# Patient Record
Sex: MALE | Race: WHITE
[De-identification: names, ages, dates, MRNs, and addresses within clinical notes are randomized per-mention and may not be internally consistent; named-entity substitution may affect disease eponyms.]

---

## 2017-09-09 ENCOUNTER — HOSPITAL ENCOUNTER (EMERGENCY)
Dept: HOSPITAL 31 - C.ER | Age: 42
Discharge: HOME | End: 2017-09-09
Payer: COMMERCIAL

## 2017-09-09 VITALS — HEART RATE: 68 BPM | RESPIRATION RATE: 18 BRPM | DIASTOLIC BLOOD PRESSURE: 72 MMHG | SYSTOLIC BLOOD PRESSURE: 110 MMHG

## 2017-09-09 VITALS — OXYGEN SATURATION: 99 % | TEMPERATURE: 97.9 F

## 2017-09-09 DIAGNOSIS — H60.91: Primary | ICD-10-CM

## 2017-09-09 PROCEDURE — 96372 THER/PROPH/DIAG INJ SC/IM: CPT

## 2017-09-09 PROCEDURE — 99283 EMERGENCY DEPT VISIT LOW MDM: CPT

## 2017-09-09 NOTE — C.PDOC
History Of Present Illness


42 yr old male presents to the ER with complaints of right ear pain for the 

past few days. Patient [place] [employer] [place] and states the pain 

started afterwards. Patient denies fever, chills, hearing loss, ear discharge, 

headache, weakness or numbness.


Time Seen by Provider: 09/09/17 08:29


Chief Complaint (Nursing): ENT Problem


History Per: Patient


History/Exam Limitations: no limitations


Onset/Duration Of Symptoms: Days





Past Medical History


Reviewed: Historical Data, Nursing Documentation, Vital Signs


Vital Signs: 


 Last Vital Signs











Temp  97.9 F   09/09/17 08:23


 


Pulse  69   09/09/17 08:23


 


Resp  16   09/09/17 08:23


 


BP  113/75   09/09/17 08:23


 


Pulse Ox  99   09/09/17 08:43














- Medical History


PMH: Asthma, Hypercholesterolemia


Surgical History: Appendectomy


Family History: States: No Known Family Hx





- Social History


Hx Tobacco Use: No


Hx Alcohol Use: No


Hx Substance Use: No





- Immunization History


Hx Tetanus Toxoid Vaccination: No


Hx Influenza Vaccination: No


Hx Pneumococcal Vaccination: No





Review Of Systems


Except As Marked, All Systems Reviewed And Found Negative.


Constitutional: Negative for: Fever, Chills


ENT: Positive for: Ear Pain (Right).  Negative for: Ear Discharge


Neurological: Negative for: Weakness, Numbness, Headache





Physical Exam





- Physical Exam


Appears: Non-toxic, No Acute Distress


Skin: Warm, Dry, No Rash


Head: Atraumatic, Normacephalic


Ear(s): Left: Normal, Right: Other ((+) Ear canal, mild edema. (-) TM intact 

without erythema. No redness or tenderness over the mastoid. No preauricular 

lymph node. )


Throat: Normal, No Erythema, No Exudate, No Drooling


Neck: Normal, Normal ROM, Supple


Extremity: Normal ROM, No Swelling


Neurological/Psych: Oriented x3, Normal Speech





ED Course And Treatment


O2 Sat by Pulse Oximetry: 99 (RA)


Pulse Ox Interpretation: Normal





Disposition





- Disposition


Referrals: 


Behin,Babak, MD [Staff Provider] - 


Whit Salinas MD [Non-Staff] - 


Disposition: HOME/ ROUTINE


Disposition Time: 08:43


Condition: GOOD


Additional Instructions: 


Please follow up with your doctor. The contact for the ENT specialist is also 

provided. Return to the ER for any worsening symptoms, fever, redness or pain 

or swelling behind the ear or in the face, if you are not any better in 2 days, 

or for any other concerns.  


Prescriptions: 


Ciprofloxacin HCl [Cipro] 500 mg PO BID #14 tablet


Ciprofloxacin/Dexamethasone [Ciprodex 0.3%-0.1% 7.5 Ml] 4 drop OT QID #1 bottle


Instructions:  Otitis Externa (ED)


Forms:  [facility] (English)





- Clinical Impression


Clinical Impression: 


 Otitis externa








- Scribe Statement


The provider has reviewed the documentation as recorded by the Luda Rapp


Provider Attestation: 


All medical record entries made by the Luda were at my direction and 

personally dictated by me. I have reviewed the chart and agree that the record 

accurately reflects my personal performance of the history, physical exam, 

medical decision making, and the department course for this patient. I have 

also personally directed, reviewed, and agree with the discharge instructions 

and disposition.

## 2018-04-17 ENCOUNTER — HOSPITAL ENCOUNTER (INPATIENT)
Dept: HOSPITAL 31 - C.ER | Age: 43
LOS: 2 days | Discharge: HOME | DRG: 329 | End: 2018-04-19
Payer: COMMERCIAL

## 2018-04-17 DIAGNOSIS — F17.210: ICD-10-CM

## 2018-04-17 DIAGNOSIS — K60.3: ICD-10-CM

## 2018-04-17 DIAGNOSIS — K65.1: ICD-10-CM

## 2018-04-17 DIAGNOSIS — J45.909: ICD-10-CM

## 2018-04-17 DIAGNOSIS — K61.2: Primary | ICD-10-CM

## 2018-04-17 LAB
ALBUMIN SERPL-MCNC: 4.1 G/DL (ref 3.5–5)
ALBUMIN/GLOB SERPL: 0.9 {RATIO} (ref 1–2.1)
ALT SERPL-CCNC: 39 U/L (ref 21–72)
APTT BLD: 30 SECONDS (ref 21–34)
AST SERPL-CCNC: 31 U/L (ref 17–59)
BASOPHILS # BLD AUTO: 0.1 K/UL (ref 0–0.2)
BASOPHILS NFR BLD: 0.6 % (ref 0–2)
BILIRUB UR-MCNC: NEGATIVE MG/DL
BUN SERPL-MCNC: 12 MG/DL (ref 9–20)
CALCIUM SERPL-MCNC: 9 MG/DL (ref 8.6–10.4)
EOSINOPHIL # BLD AUTO: 0.2 K/UL (ref 0–0.7)
EOSINOPHIL NFR BLD: 1.6 % (ref 0–4)
ERYTHROCYTE [DISTWIDTH] IN BLOOD BY AUTOMATED COUNT: 13 % (ref 11.5–14.5)
GFR NON-AFRICAN AMERICAN: > 60
GLUCOSE UR STRIP-MCNC: NORMAL MG/DL
HGB BLD-MCNC: 15.3 G/DL (ref 12–18)
INR PPP: 1.2
LEUKOCYTE ESTERASE UR-ACNC: (no result) LEU/UL
LYMPHOCYTES # BLD AUTO: 3.1 K/UL (ref 1–4.3)
LYMPHOCYTES NFR BLD AUTO: 21.6 % (ref 20–40)
MCH RBC QN AUTO: 31.8 PG (ref 27–31)
MCHC RBC AUTO-ENTMCNC: 34.9 G/DL (ref 33–37)
MCV RBC AUTO: 91.1 FL (ref 80–94)
MONOCYTES # BLD: 1 K/UL (ref 0–0.8)
MONOCYTES NFR BLD: 6.8 % (ref 0–10)
NEUTROPHILS # BLD: 10.1 K/UL (ref 1.8–7)
NEUTROPHILS NFR BLD AUTO: 69.4 % (ref 50–75)
NRBC BLD AUTO-RTO: 0.2 % (ref 0–2)
PH UR STRIP: 5 [PH] (ref 5–8)
PLATELET # BLD: 244 K/UL (ref 130–400)
PMV BLD AUTO: 7.5 FL (ref 7.2–11.7)
PROT UR STRIP-MCNC: (no result) MG/DL
PROTHROMBIN TIME: 13.4 SECONDS (ref 9.7–12.2)
RBC # BLD AUTO: 4.81 MIL/UL (ref 4.4–5.9)
RBC # UR STRIP: NEGATIVE /UL
SP GR UR STRIP: 1.04 (ref 1–1.03)
SQUAMOUS EPITHIAL: 1 /HPF (ref 0–5)
UROBILINOGEN UR-MCNC: 4 MG/DL (ref 0.2–1)
WBC # BLD AUTO: 14.5 K/UL (ref 4.8–10.8)

## 2018-04-17 PROCEDURE — 0D9P8ZZ DRAINAGE OF RECTUM, VIA NATURAL OR ARTIFICIAL OPENING ENDOSCOPIC: ICD-10-PCS

## 2018-04-17 RX ADMIN — HYDROMORPHONE HYDROCHLORIDE PRN MG: 1 INJECTION, SOLUTION INTRAMUSCULAR; INTRAVENOUS; SUBCUTANEOUS at 18:40

## 2018-04-17 RX ADMIN — HYDROMORPHONE HYDROCHLORIDE PRN MG: 1 INJECTION, SOLUTION INTRAMUSCULAR; INTRAVENOUS; SUBCUTANEOUS at 18:30

## 2018-04-17 RX ADMIN — HYDROMORPHONE HYDROCHLORIDE PRN MG: 1 INJECTION, SOLUTION INTRAMUSCULAR; INTRAVENOUS; SUBCUTANEOUS at 18:50

## 2018-04-17 RX ADMIN — DEXTROSE AND SODIUM CHLORIDE SCH MLS/HR: 5; 450 INJECTION, SOLUTION INTRAVENOUS at 20:28

## 2018-04-17 RX ADMIN — HYDROMORPHONE HYDROCHLORIDE PRN MG: 1 INJECTION, SOLUTION INTRAMUSCULAR; INTRAVENOUS; SUBCUTANEOUS at 18:20

## 2018-04-17 NOTE — CP.PCM.HP
History of Present Illness





- History of Present Illness


History of Present Illness: 





History Of Present Illness


43 year old male presents to the ED c/o perirectal abscess for the past 4 days. 

Patient reports yesterday had a fever of 101. Patient denies weakness, numbness

, CP, SOB, nausea, vomit, diarrhea. 





Past Patient History





- Past Social History


Smoking Status: Light Smoker < 10 Cigarettes Daily





- CARDIAC


Hx Hypercholesterolemia: Yes





- PULMONARY


Hx Asthma: Yes





- MUSCULOSKELETAL/RHEUMATOLOGICAL


Hx Falls: No





- PSYCHIATRIC


Hx Substance Use: No





- SURGICAL HISTORY


Hx Appendectomy: Yes





- ANESTHESIA


Hx Anesthesia: Yes


Hx Anesthesia Reactions: No


Hx Malignant Hyperthermia: No


Has any member of the family had a problem w/ anesthesia?: No





Meds


Allergies/Adverse Reactions: 


 Allergies











Allergy/AdvReac Type Severity Reaction Status Date / Time


 


No Known Allergies Allergy   Verified 04/17/18 10:20














Results





- Vital Signs


Recent Vital Signs: 





 Last Vital Signs











Temp  98.7 F   04/17/18 20:30


 


Pulse  85   04/17/18 20:30


 


Resp  20   04/17/18 20:30


 


BP  108/74   04/17/18 20:30


 


Pulse Ox  95   04/17/18 20:30














- Labs


Result Diagrams: 


 04/17/18 11:31





 04/17/18 12:06


Labs: 





 Laboratory Results - last 24 hr











  04/17/18 04/17/18 04/17/18





  11:31 12:06 12:06


 


WBC  14.5 H  


 


RBC  4.81  


 


Hgb  15.3  


 


Hct  43.8  


 


MCV  91.1  D  


 


MCH  31.8 H  


 


MCHC  34.9  


 


RDW  13.0  


 


Plt Count  244  


 


MPV  7.5  


 


Neut % (Auto)  69.4  


 


Lymph % (Auto)  21.6  


 


Mono % (Auto)  6.8  


 


Eos % (Auto)  1.6  


 


Baso % (Auto)  0.6  


 


Neut # (Auto)  10.1 H  


 


Lymph # (Auto)  3.1  


 


Mono # (Auto)  1.0 H  


 


Eos # (Auto)  0.2  


 


Baso # (Auto)  0.1  


 


PT   13.4 H 


 


INR   1.2 


 


APTT   30 


 


Sodium    141


 


Potassium    4.3


 


Chloride    100


 


Carbon Dioxide    26


 


Anion Gap    19


 


BUN    12


 


Creatinine    1.0


 


Est GFR ( Amer)    > 60


 


Est GFR (Non-Af Amer)    > 60


 


Random Glucose    87


 


Calcium    9.0


 


Total Bilirubin    1.1


 


AST    31


 


ALT    39


 


Alkaline Phosphatase    63


 


Total Protein    8.7 H


 


Albumin    4.1


 


Globulin    4.5 H


 


Albumin/Globulin Ratio    0.9 L


 


Urine Color   


 


Urine Clarity   


 


Urine pH   


 


Ur Specific Gravity   


 


Urine Protein   


 


Urine Glucose (UA)   


 


Urine Ketones   


 


Urine Blood   


 


Urine Nitrate   


 


Urine Bilirubin   


 


Urine Urobilinogen   


 


Ur Leukocyte Esterase   


 


Urine WBC (Auto)   


 


Ur Squamous Epith Cells   














  04/17/18





  14:05


 


WBC 


 


RBC 


 


Hgb 


 


Hct 


 


MCV 


 


MCH 


 


MCHC 


 


RDW 


 


Plt Count 


 


MPV 


 


Neut % (Auto) 


 


Lymph % (Auto) 


 


Mono % (Auto) 


 


Eos % (Auto) 


 


Baso % (Auto) 


 


Neut # (Auto) 


 


Lymph # (Auto) 


 


Mono # (Auto) 


 


Eos # (Auto) 


 


Baso # (Auto) 


 


PT 


 


INR 


 


APTT 


 


Sodium 


 


Potassium 


 


Chloride 


 


Carbon Dioxide 


 


Anion Gap 


 


BUN 


 


Creatinine 


 


Est GFR ( Amer) 


 


Est GFR (Non-Af Amer) 


 


Random Glucose 


 


Calcium 


 


Total Bilirubin 


 


AST 


 


ALT 


 


Alkaline Phosphatase 


 


Total Protein 


 


Albumin 


 


Globulin 


 


Albumin/Globulin Ratio 


 


Urine Color  Yellow


 


Urine Clarity  Clear


 


Urine pH  5.0


 


Ur Specific Gravity  1.040 H


 


Urine Protein  1+ H


 


Urine Glucose (UA)  Normal


 


Urine Ketones  Negative


 


Urine Blood  Negative


 


Urine Nitrate  Negative


 


Urine Bilirubin  Negative


 


Urine Urobilinogen  4.0


 


Ur Leukocyte Esterase  Neg


 


Urine WBC (Auto)  1


 


Ur Squamous Epith Cells  1














Assessment & Plan


(1) Perianal abscess


Status: Acute

## 2018-04-17 NOTE — CT
PROCEDURE:  CT Pelvis with contrast 



HISTORY:

perirectal abscess



COMPARISON:

None.



TECHNIQUE:

Contiguous axial images of the pelvis with intravenous contrast only. 

Coronal and sagittal reformats generated.



Contrast dose: Visipaque 320, 100 cc



Radiation dose:



Total exam DLP = 865.47 mGy-cm.



This CT exam was performed using one or more of the following dose 

reduction techniques: Automated exposure control, adjustment of the 

mA and/or kV according to patient size, and/or use of iterative 

reconstruction technique.



FINDINGS:

In the right perianal space, there is local soft tissue edema 

posterior to the level of the anus extending to the midline and 

slightly to the left. Local lucency is appreciate within this density 

measuring 3.5 x 1.6 cm suggestive of a small abscess. 



BLADDER:

Unremarkable. No mass. 



REPRODUCTIVE ORGANS:

Unremarkable. 



VISUALIZED BOWEL:

Sigmoid diverticulosis is identified without diverticulitis as 

imaged. A linear hyperdensity at the cecal base may reflect surgical 

clips status post prior appendectomy.  The appendix is not 

identified.  Clinically correlate appear



PERITONEUM:

A limited matter perineal fat is identified within a small right 

inguinal hernia without bowel involvement. No free fluid. No free 

air. 



LYMPH NODES:

Unremarkable. No enlarged lymph nodes. 



VASCULATURE:

Unremarkable.



BONES:

No fracture or focal lesion.  



OTHER FINDINGS:

None.



IMPRESSION:

A small right perianal abscess is appreciated inferiorly slightly to 

the left measuring 3.5 x 1.6 cm.



Nonacute sigmoid diverticulosis. 



Findings suggest prior right appendectomy.  Clinically correlate. 

Small right inguinal hernia contains only limited amount of 

peritoneal fat.

## 2018-04-17 NOTE — C.PDOC
History Of Present Illness


43 year old male presents to the ED c/o perirectal abscess for the past 4 days. 

Patient reports yesterday had a fever of 101. Patient denies weakness, numbness

, CP, SOB, nausea, vomit, diarrhea. 


Time Seen by Provider: 18 11:26


Chief Complaint (Nursing): Abnormal Skin Integrity


History Per: Patient


History/Exam Limitations: no limitations


Onset/Duration Of Symptoms: Days


Current Symptoms Are (Timing): Still Present


Location Of Injury: Left: Buttock (perirectal abscess)


Quality Of Symptoms: Painful, Swollen


Recent travel outside of the [place]: No


Additional History Per: Patient





Past Medical History


Reviewed: Historical Data, Nursing Documentation, Vital Signs


Vital Signs: 


 Last Vital Signs











Temp  100.5 F H  18 13:56


 


Pulse  100 H  18 13:56


 


Resp  18   18 13:56


 


BP  109/76   18 13:56


 


Pulse Ox  99   18 16:26














- Medical History


PMH: Asthma, Hypercholesterolemia


Surgical History: Appendectomy


Family History: States: Unknown Family Hx





- Social History


Hx Tobacco Use: No


Hx Alcohol Use: No


Hx Substance Use: No





- Immunization History


Hx Tetanus Toxoid Vaccination: No


Hx Influenza Vaccination: No


Hx Pneumococcal Vaccination: No





Review Of Systems


Constitutional: Negative for: Fever, Chills


Cardiovascular: Negative for: Chest Pain


Respiratory: Negative for: Shortness of Breath


Gastrointestinal: Negative for: Abdominal Pain


Musculoskeletal: Positive for: Back Pain


Skin: Positive for: Other (perirectal abscess)


Neurological: Negative for: Weakness, Numbness





Physical Exam





- Physical Exam


Appears: Non-toxic, No Acute Distress


Skin: Normal Color, Warm, Dry, Other (opening 1 in abive anal area with 

purulenrt drainage, tender )


Head: Atraumatic, Normacephalic


Eye(s): bilateral: Normal Inspection


Nose: No Discharge


Oral Mucosa: Moist


Back: Other (tenderness to wound 1 inch above anal area)


Extremity: Normal ROM, No Tenderness, Capillary Refill (< 2 seconds), No 

Swelling


Pulses: Left Dorsalis Pedis: Normal, Right Dorsalis Pedis: Normal


Neurological/Psych: Oriented x3, Normal Motor, Normal Sensation


Gait: Steady





ED Course And Treatment





- Laboratory Results


Result Diagrams: 


 18 11:31





 18 12:06


ECG: Interpreted By Me, Viewed By Me


ECG Rhythm: Sinus Rhythm, R BBB (incomplete)


Rate From EC (BPM)


O2 Sat by Pulse Oximetry: 99 (On RA)


Pulse Ox Interpretation: Normal





- CT Scan/US


  ** Pelvis CT


Other Rad Studies (CT/US): Read By Radiologist, Radiology Report Reviewed


CT/US Interpretation: Accession No. : [id number].  Patient Name / ID : 

DEYA LAM  / [id number].  Exam Date : 2018 13:22:13 ( Approved ).  

Study Comment :  Sex / Age : M  / 43Y.  Creator : April Bernal.  Dictator : 

Dylan Field MD.   :  Approver : Dylan Field MD.  

Approver2 :  Report Date : 2018 13:37:12.  My Comment :  *****************

******************************************************************.  PROCEDURE:

  CT Pelvis with contrast.  HISTORY:  perirectal abscess.  COMPARISON:  None.  

TECHNIQUE:  Contiguous axial images of the pelvis with intravenous contrast 

only. Coronal and sagittal reformats generated.  Contrast dose: Visipaque 320, 

100 cc.  Radiation dose:  Total exam DLP = 865.47 mGy-cm.  This CT exam was 

performed using one or more of the following dose reduction techniques: 

Automated exposure control, adjustment of the mA and/or kV according to patient 

size, and/or use of iterative reconstruction technique.  FINDINGS:  In the 

right perianal space, there is local soft tissue edema posterior to the level 

of the anus extending to the midline and slightly to the left. Local lucency is 

appreciate within this density measuring 3.5 x 1.6 cm suggestive of a small 

abscess.  BLADDER:  Unremarkable. No mass.  REPRODUCTIVE ORGANS:  Unremarkable.

  VISUALIZED BOWEL:  Sigmoid diverticulosis is identified without 

diverticulitis as imaged. A linear hyperdensity at the cecal base may reflect 

surgical clips status post prior appendectomy.  The appendix is not identified.

  Clinically correlate appear.  PERITONEUM:  A limited matter perineal fat is 

identified within a small right inguinal hernia without bowel involvement. No 

free fluid. No free air.  LYMPH NODES:  Unremarkable. No enlarged lymph nodes.  

VASCULATURE:  Unremarkable.  BONES:  No fracture or focal lesion.  OTHER 

FINDINGS:  None.  IMPRESSION:  A small right perianal abscess is appreciated 

inferiorly slightly to the left measuring 3.5 x 1.6 cm.  Nonacute sigmoid 

diverticulosis.  Findings suggest prior right appendectomy.  Clinically 

correlate. Small right inguinal hernia contains only limited amount of 

peritoneal fat.


Progress Note: Patient was treated with Morphine, IVF and Zosyn IVPB. Patient 

had CT that was pos for perianal abscess, WBC elevated. case was d/w pt's PMD 

 who requested on call Surgery consult from Dr. Miranda. Case was d

/w  who requested patient be NPO for surgery and accepted patient 

for an admission to his service.  requested   for Internal 

medicine consult. Contacted , case discussed. Patient developed fever

, Tylenol IV was ordered.





Medical Decision Making


Medical Decision Making: 


Impression: perirectal abscess


Plan:


* CT scan pelvis


* Labs


* Morphine 4 mg IVP


* IV fluids


* Zosyn 100 ml IV


* Blood culture











Disposition





- Disposition


Disposition: HOSPITALIZED


Disposition Time: 14:36


Condition: FAIR





- Clinical Impression


Clinical Impression: 


 Perianal abscess








- PA / NP / Resident Statement


MD/DO has reviewed & agrees with the documentation as recorded.





- Scribe Statement


The provider has reviewed the documentation as recorded by the Scribe


Julio César Cantu





All medical record entries made by the Scribe were at my direction and 

personally dictated by me. I have reviewed the chart and agree that the record 

accurately reflects my personal performance of the history, physical exam, 

medical decision making, and the department course for this patient. I have 

also personally directed, reviewed, and agree with the discharge instructions 

and disposition.





Decision To Admit





- Pt Status Changed To:


Hospital Disposition Of: Inpatient





- Admit Certification


Admit to Inpatient:: After my assessment, the patient will require 

hospitalization for at least two midnights.  This is because of the severity of 

symptoms shown, intensity of services needed, and/or the medical risk in this 

patient being treated as an outpatient.





- InPatient:


Physician Admission Certification: I certify that this patient requires 2 or 

more midnights of care for the following reason:: Patient will need to go OR 

and will need more than 2 days of hospitalization.





- .


Bed Request Type: Regular


Patient Diagnosis: 


 Perianal abscess

## 2018-04-18 LAB
BASOPHILS # BLD AUTO: 0.1 K/UL (ref 0–0.2)
BASOPHILS NFR BLD: 0.5 % (ref 0–2)
BUN SERPL-MCNC: 12 MG/DL (ref 9–20)
CALCIUM SERPL-MCNC: 8.4 MG/DL (ref 8.6–10.4)
EOSINOPHIL # BLD AUTO: 0.2 K/UL (ref 0–0.7)
EOSINOPHIL NFR BLD: 1.1 % (ref 0–4)
ERYTHROCYTE [DISTWIDTH] IN BLOOD BY AUTOMATED COUNT: 12.7 % (ref 11.5–14.5)
GFR NON-AFRICAN AMERICAN: > 60
HGB BLD-MCNC: 12.8 G/DL (ref 12–18)
LYMPHOCYTES # BLD AUTO: 2.8 K/UL (ref 1–4.3)
LYMPHOCYTES NFR BLD AUTO: 20 % (ref 20–40)
MCH RBC QN AUTO: 31.2 PG (ref 27–31)
MCHC RBC AUTO-ENTMCNC: 34.4 G/DL (ref 33–37)
MCV RBC AUTO: 90.8 FL (ref 80–94)
MONOCYTES # BLD: 1.1 K/UL (ref 0–0.8)
MONOCYTES NFR BLD: 7.6 % (ref 0–10)
NEUTROPHILS # BLD: 9.9 K/UL (ref 1.8–7)
NEUTROPHILS NFR BLD AUTO: 70.8 % (ref 50–75)
NRBC BLD AUTO-RTO: 0 % (ref 0–2)
PLATELET # BLD: 227 K/UL (ref 130–400)
PMV BLD AUTO: 7.6 FL (ref 7.2–11.7)
RBC # BLD AUTO: 4.1 MIL/UL (ref 4.4–5.9)
WBC # BLD AUTO: 14 K/UL (ref 4.8–10.8)

## 2018-04-18 PROCEDURE — 0D9P8ZZ DRAINAGE OF RECTUM, VIA NATURAL OR ARTIFICIAL OPENING ENDOSCOPIC: ICD-10-PCS

## 2018-04-18 RX ADMIN — TAZOBACTAM SODIUM AND PIPERACILLIN SODIUM SCH MLS/HR: 375; 3 INJECTION, SOLUTION INTRAVENOUS at 09:59

## 2018-04-18 RX ADMIN — DEXTROSE AND SODIUM CHLORIDE SCH MLS/HR: 5; 450 INJECTION, SOLUTION INTRAVENOUS at 18:40

## 2018-04-18 RX ADMIN — TAZOBACTAM SODIUM AND PIPERACILLIN SODIUM SCH MLS/HR: 375; 3 INJECTION, SOLUTION INTRAVENOUS at 14:16

## 2018-04-18 RX ADMIN — DEXTROSE AND SODIUM CHLORIDE SCH: 5; 450 INJECTION, SOLUTION INTRAVENOUS at 06:45

## 2018-04-18 RX ADMIN — DEXTROSE AND SODIUM CHLORIDE SCH MLS/HR: 5; 450 INJECTION, SOLUTION INTRAVENOUS at 13:45

## 2018-04-18 RX ADMIN — TAZOBACTAM SODIUM AND PIPERACILLIN SODIUM SCH MLS/HR: 375; 3 INJECTION, SOLUTION INTRAVENOUS at 21:40

## 2018-04-18 RX ADMIN — HYDROMORPHONE HYDROCHLORIDE PRN MG: 1 INJECTION, SOLUTION INTRAMUSCULAR; INTRAVENOUS; SUBCUTANEOUS at 16:17

## 2018-04-18 RX ADMIN — HYDROMORPHONE HYDROCHLORIDE PRN MG: 1 INJECTION, SOLUTION INTRAMUSCULAR; INTRAVENOUS; SUBCUTANEOUS at 16:48

## 2018-04-18 RX ADMIN — HYDROMORPHONE HYDROCHLORIDE PRN MG: 1 INJECTION, SOLUTION INTRAMUSCULAR; INTRAVENOUS; SUBCUTANEOUS at 16:28

## 2018-04-18 RX ADMIN — TAZOBACTAM SODIUM AND PIPERACILLIN SODIUM SCH MLS/HR: 375; 3 INJECTION, SOLUTION INTRAVENOUS at 03:02

## 2018-04-18 NOTE — CP.PCM.CON
History of Present Illness





- History of Present Illness


History of Present Illness: 





43 year old male presents to the ED c/o perirectal abscess for the past 4 days. 

Patient reports yesterday had a fever of 101. Patient denies weakness, numbness

, CP, SOB, nausea, vomit, diarrhea. Referred for ID eval for rx perirectal 

abscess  For I and D in OR Dr Miranda 











- Medical History


PMH: Asthma, Hypercholesterolemia


Surgical History: Appendectomy


Family History: States: Unknown Family Hx





Review of Systems





- Constitutional


Constitutional: As Per HPI





- EENT


Eyes: absent: As Per HPI, Blind Spots, Blurred Vision, Change in Vision, 

Decreased Night Vision, Diplopia, Discharge, Dry Eye, Exophthalmos, Floaters, 

Irritation, Itchy Eyes, Loss of Peripheral Vision, Pain, Photophobia, Requires 

Corrective Lenses, Sees Flashes, Spots in Vision, Tunnel Vision, Other Visual 

Disturbances, Loss of Vision, Other


Ears: absent: As Per HPI, Decreased Hearing, Ear Discharge, Ear Pain, Tinnitus, 

Abnormal Hearing, Disequilibrium, Dizziness, Other


Nose/Mouth/Throat: absent: As Per HPI, Epistaxis, Nasal Congestion, Nasal 

Discharge, Nasal Obstruction, Nasal Trauma, Nose Pain, Post Nasal Drip, Sinus 

Pain, Sinus Pressure, Bleeding Gums, Change in Voice, Dental Pain, Dry Mouth, 

Dysphagia, Halitosis, Hoarsness, Lip Swelling, Mouth Lesions, Mouth Pain, 

Odynophagia, Sore Throat, Throat Swelling, Tongue Swelling, Facial Pain, Neck 

Pain, Neck Mass, Other





- Cardiovascular


Cardiovascular: absent: As Per HPI, Acrocyanosis, Chest Pain, Chest Pain at Rest

, Chest Pain with Activity, Claudication, Diaphoresis, Dyspnea, Dyspnea on 

Exertion, Edema, Irregular Heart Rhythm, Pain Radiating to Arm/Neck/Jaw, Leg 

Edema, Leg Ulcers, Lightheadedness, Orthopnea, Palpitations, Paroxysmal 

Nocturnal Dyspnea, Pedal Edema, Radiating Pain, Rapid Heart Rate, Slow Heart 

Rate, Syncope, Other





- Respiratory


Respiratory: absent: As Per HPI, Cough, Dyspnea, Hemoptysis, Dyspnea on Exertion

, Wheezing, Snoring, Stridor, Pain on Inspiration, Chest Congestion, Excessive 

Mucous Production, Change in Mucous Color, Pain with Coughing, Other





- Gastrointestinal


Gastrointestinal: As Per HPI





- Genitourinary


Genitourinary: absent: As Per HPI, Change in Urinary Stream, Difficulty 

Urinating, Dysuria, Flank Pain, Hematuria, Pyuria, Nocturia, Urinary 

Incontinence, Urinary Frequency, Urinary Hesitance, Urinary Urgency, Voiding 

Freq/Small Amts, Freq UTI, Hx Renal/Bladder Calculi, Hx /Renal Surgery, 

Bladder Distension, Other





- Musculoskeletal


Musculoskeletal: absent: As Per HPI, Abnormal Gait, Arthralgias, Atrophy, Back 

Pain, Deformity, Joint Swelling, Limited Range of Motion, Loss of Height, 

Muscle Cramps, Muscle Weakness, Myalgias, Neck Pain, Numbness, Radiating Pain 

into Limb, Stiffness, Tingling, Other





- Integumentary


Integumentary: As Per HPI, Skin Pain, Wounds





- Neurological


Neurological: absent: As Per HPI, Abnormal Gait, Abnormal Hearing, Abnormal 

Movements, Abnormal Speech, Behavioral Changes, Burning Sensations, Confusion, 

Convulsions, Disequilibrium, Dizziness, Numbness, Focal Weakness, Frequent Falls

, Headaches, Lack of Coordination, Loss of Vision, Memory Loss, Paresthesias, 

Radicular Pain, Restless Legs, Sensory Deficit, Syncope, Tingling, Tremor, 

Vertigo, Weakness, Other Visual Disturbances, Other





- Psychiatric


Psychiatric: absent: As Per HPI, Abnormal Sleep Pattern, Anhedonia, Anxiety, 

Auditory Hallucinations, Behavioral Changes, Change in Appetite, Change in 

Libido, Confusion, Depression, Difficulty Concentrating, Hallucinations, 

Homicidal Ideation, Hopelessness, Irritability, Memory Loss, Mood Swings, Panic 

Attacks, Paranoia, Suicidal Ideation, Visual Hallucinations, Tactile 

Hallucinations, Other





- Endocrine


Endocrine: absent: As Per HPI, Change in Body Appearance, Change in Libido, 

Cold Intolorance, Deepening of Voice, Excessive Sweating, Fatigue, Flushing, 

Heat Intolorance, Increase in Ring/Shoe/Hat Size, Palpitations, Polydipsia, 

Polyphagia, Polyuria, Other





- Hematologic/Lymphatic


Hematologic: absent: As Per HPI, Easy Bleeding, Easy Bruising, Lymphadenopathy, 

Other





Past Patient History





- Past Social History


Smoking Status: Light Smoker < 10 Cigarettes Daily





- CARDIAC


Hx Hypercholesterolemia: Yes





- PULMONARY


Hx Asthma: Yes





- MUSCULOSKELETAL/RHEUMATOLOGICAL


Hx Falls: No





- PSYCHIATRIC


Hx Substance Use: No





- SURGICAL HISTORY


Hx Appendectomy: Yes





- ANESTHESIA


Hx Anesthesia: Yes


Hx Anesthesia Reactions: No


Hx Malignant Hyperthermia: No


Has any member of the family had a problem w/ anesthesia?: No





Meds


Allergies/Adverse Reactions: 


 Allergies











Allergy/AdvReac Type Severity Reaction Status Date / Time


 


No Known Allergies Allergy   Verified 04/17/18 10:20














- Medications


Medications: 


 Current Medications





Docusate Sodium (Colace)  100 mg PO BID [facility]


   Last Admin: 04/18/18 09:55 Dose:  Not Given


Hydromorphone HCl (Dilaudid)  0.5 mg IVP Q10M PRN


   PRN Reason: Pain, moderate (4-7)


   Stop: 04/18/18 18:27


Dextrose/Sodium Chloride (Dextrose 5%/0.45% Ns 1000 Ml)  1,000 mls @ 80 mls/hr 

IV .[id number] [facility]


   Last Admin: 04/18/18 13:45 Dose:  80 mls/hr


Lactated Ringer's (Lactated Ringer's)  1,000 mls @ 100 mls/hr IV .Q10H [facility]


   Last Admin: 04/18/18 04:15 Dose:  Not Given


Piperacillin Sod/Tazobactam Sod (Zosyn 3.375 Gm Iv Premix)  3.375 gm in 50 mls 

@ 100 mls/hr IVPB Q6H [facility]


   PRN Reason: Protocol


   Last Admin: 04/18/18 14:16 Dose:  100 mls/hr


Lactated Ringer's (Lactated Ringer's)  1,000 mls @ 150 mls/hr IV .Q6H40M [facility]


Morphine Sulfate (Morphine)  5 mg IVP Q4 PRN


   PRN Reason: Pain, severe (8-10)


   Last Admin: 04/18/18 14:13 Dose:  5 mg


Ondansetron HCl (Zofran Inj)  4 mg IVP Q6 PRN


   PRN Reason: Nausea/Vomiting


   Last Admin: 04/17/18 21:08 Dose:  4 mg


Ondansetron HCl (Zofran Inj)  4 mg IVP ONCE PRN


   PRN Reason: Nausea/Vomiting


   Stop: 04/18/18 18:28


Oxycodone/Acetaminophen (Percocet 5/325 Mg Tab)  2 tab PO Q4H PRN


   PRN Reason: pain


   Stop: 04/20/18 18:05


Pantoprazole Sodium (Protonix Inj)  40 mg IVP DAILY [facility]


   Last Admin: 04/18/18 09:59 Dose:  40 mg


Pneumococcal Polyvalent Vaccine (Pneumovax 23 Vaccine)  0.5 ml IM .ONCE ONE


   Stop: 04/19/18 10:01











Physical Exam





- Constitutional


Appears: Non-toxic, Chronically Ill





- Head Exam


Head Exam: NORMOCEPHALIC





- Eye Exam


Eye Exam: PERRL.  absent: Scleral icterus





- ENT Exam


ENT Exam: Mucous Membranes Dry, Normal External Ear Exam





- Neck Exam


Neck exam: Negative for: Lymphadenopathy





- Respiratory Exam


Respiratory Exam: Decreased Breath Sounds, Clear to Auscultation Bilateral





- Cardiovascular Exam


Cardiovascular Exam: REGULAR RHYTHM, +S1, +S2





- GI/Abdominal Exam


GI & Abdominal Exam: Diminished Bowel Sounds, Soft.  absent: Tenderness





- Rectal Exam


Rectal Exam: Deferred





-  Exam


 Exam: NORMAL INSPECTION





- Extremities Exam


Extremities exam: Positive for: pedal pulses present.  Negative for: calf 

tenderness, pedal edema, tenderness





- Back Exam


Back exam: absent: CVA tenderness (L), CVA tenderness (R)





- Neurological Exam


Neurological exam: Alert, CN II-XII Intact, Oriented x3, Reflexes Normal





- Psychiatric Exam


Psychiatric exam: Normal Mood





- Skin


Skin Exam: Dry





Results





- Vital Signs


Recent Vital Signs: 


 Last Vital Signs











Temp  100 F H  04/18/18 16:08


 


Pulse  94 H  04/18/18 16:55


 


Resp  11 L  04/18/18 16:55


 


BP  110/65   04/18/18 16:55


 


Pulse Ox  100   04/18/18 16:55














- Labs


Result Diagrams: 


 04/18/18 06:40





 04/18/18 06:40


Labs: 


 Laboratory Results - last 24 hr











  04/18/18 04/18/18





  06:40 06:40


 


WBC  14.0 H 


 


RBC  4.10 L 


 


Hgb  12.8  D 


 


Hct  37.2 


 


MCV  90.8 


 


MCH  31.2 H 


 


MCHC  34.4 


 


RDW  12.7 


 


Plt Count  227 


 


MPV  7.6 


 


Neut % (Auto)  70.8 


 


Lymph % (Auto)  20.0 


 


Mono % (Auto)  7.6 


 


Eos % (Auto)  1.1 


 


Baso % (Auto)  0.5 


 


Neut # (Auto)  9.9 H 


 


Lymph # (Auto)  2.8 


 


Mono # (Auto)  1.1 H 


 


Eos # (Auto)  0.2 


 


Baso # (Auto)  0.1 


 


Sodium   137


 


Potassium   3.8


 


Chloride   100


 


Carbon Dioxide   26


 


Anion Gap   15


 


BUN   12


 


Creatinine   1.1


 


Est GFR ( Amer)   > 60


 


Est GFR (Non-Af Amer)   > 60


 


Random Glucose   102


 


Calcium   8.4 L














Assessment & Plan


(1) Perianal abscess


Status: Acute   





- Assessment and Plan (Free Text)


Assessment: 





await cultures from OR


will renew IV antibiotics 


cont wound care and pain management

## 2018-04-18 NOTE — CP.PCM.PN
Subjective





- Date & Time of Evaluation


Date of Evaluation: 04/18/18


Time of Evaluation: 18:40





- Subjective


Subjective: 





43 year old male presents to the ED c/o perirectal abscess for the past 4 days. 

Patient reports yesterday had a fever of 101. Patient denies weakness, numbness

, CP, SOB, nausea, vomit, diarrhea. Referred for ID eval for rx perirectal 

abscess  For I and D in OR Dr Miranda





Objective





- Vital Signs/Intake and Output


Vital Signs (last 24 hours): 


 











Temp Pulse Resp BP Pulse Ox


 


 99.3 F   90   20   97/60 L  95 


 


 04/18/18 17:30  04/18/18 17:30  04/18/18 17:30  04/18/18 17:30  04/18/18 17:30








Intake and Output: 


 











 04/18/18 04/19/18





 18:59 06:59


 


Intake Total 1150 


 


Balance 1150 














- Medications


Medications: 


 Current Medications





Docusate Sodium (Colace)  100 mg PO BID [facility]


   Last Admin: 04/18/18 18:34 Dose:  100 mg


Dextrose/Sodium Chloride (Dextrose 5%/0.45% Ns 1000 Ml)  1,000 mls @ 80 mls/hr 

IV .[id number] [facility]


   Last Admin: 04/18/18 18:40 Dose:  80 mls/hr


Lactated Ringer's (Lactated Ringer's)  1,000 mls @ 100 mls/hr IV .Q10H [facility]


   Last Admin: 04/18/18 04:15 Dose:  Not Given


Piperacillin Sod/Tazobactam Sod (Zosyn 3.375 Gm Iv Premix)  3.375 gm in 50 mls 

@ 100 mls/hr IVPB Q6H [facility]


   PRN Reason: Protocol


   Last Admin: 04/18/18 21:40 Dose:  100 mls/hr


Lactated Ringer's (Lactated Ringer's)  1,000 mls @ 150 mls/hr IV .Q6H40M [facility]


Morphine Sulfate (Morphine)  5 mg IVP Q4 PRN


   PRN Reason: Pain, severe (8-10)


   Last Admin: 04/18/18 18:34 Dose:  5 mg


Ondansetron HCl (Zofran Inj)  4 mg IVP Q6 PRN


   PRN Reason: Nausea/Vomiting


   Last Admin: 04/17/18 21:08 Dose:  4 mg


Oxycodone/Acetaminophen (Percocet 5/325 Mg Tab)  2 tab PO Q4H PRN


   PRN Reason: pain


   Stop: 04/20/18 18:05


Pantoprazole Sodium (Protonix Inj)  40 mg IVP DAILY [facility]


   Last Admin: 04/18/18 09:59 Dose:  40 mg


Pneumococcal Polyvalent Vaccine (Pneumovax 23 Vaccine)  0.5 ml IM .ONCE ONE


   Stop: 04/19/18 10:01











- Labs


Labs: 


 





 04/18/18 06:40 





 04/18/18 06:40 





 











PT  13.4 SECONDS (9.7-12.2)  H  04/17/18  12:06    


 


INR  1.2   04/17/18  12:06    


 


APTT  30 SECONDS (21-34)   04/17/18  12:06    














Assessment and Plan


(1) Perianal abscess


Status: Acute

## 2018-04-18 NOTE — OP
PROCEDURE DATE:  04/17/2018



PREOPERATIVE DIAGNOSIS:  Rectal abscess.



POSTOPERATIVE DIAGNOSES:  Rectal and pelvic abscess with fistula.



PROCEDURE PERFORMED:  Proctosigmoidoscopy, anoscopy, incision and drainage

of rectal and pelvic abscess with fistulotomy.



SURGEON:  Raoul Miranda MD



ANESTHESIA:  General.



BLOOD LOSS:  30 mL.



POSTOP CONDITION:  Stable.



INDICATIONS FOR SURGERY:  This is a 43-year-old male with a painful rectal

abscess admitted through the emergency room, now taken to the operating

room on the day of admission for urgent drainage.



GROSS FINDINGS:  The patient had an abscess located at the 5 o'clock level

of the anus.  It was associated with a fistula into the midline of the anal

canal and consistent with Goodsall's rule.  The abscess was extended into

the ischiorectal space, making essentially a pelvic abscess also.



DESCRIPTION OF PROCEDURE:  The patient was taken to the operating room. 

General anesthesia was administered.  Proctosigmoidoscopy was carried out

but limited due to poor patient prep and rectal stool.  Next, anoscopy was

carried out with the above findings.  An incision was made into the abscess

and a probe was easily passed into the anal canal.  The overlying tissue

was divided and bled vigorously.  Bleeding blood vessel was repaired.  The

wound was irrigated with saline.  The pus was drained and cultured.  The

pus was thoroughly explored into the ischiorectal space and also drained. 

More cultures were taken.  Wound was irrigated with saline.  A partial

tissue flap closure was performed.  Central portion of wound was packed

open with saline gauze.  The patient tolerated procedure well.  Returned to

recovery room in stable condition.



__________________________________________

Raoul Miranda MD





DD:  04/17/2018 18:28:59

DT:  04/17/2018 18:34:30

Job # [id number]

## 2018-04-19 VITALS
HEART RATE: 87 BPM | SYSTOLIC BLOOD PRESSURE: 136 MMHG | TEMPERATURE: 99.4 F | DIASTOLIC BLOOD PRESSURE: 68 MMHG | RESPIRATION RATE: 12 BRPM

## 2018-04-19 VITALS — OXYGEN SATURATION: 98 %

## 2018-04-19 PROCEDURE — 0D9P8ZZ DRAINAGE OF RECTUM, VIA NATURAL OR ARTIFICIAL OPENING ENDOSCOPIC: ICD-10-PCS

## 2018-04-19 PROCEDURE — 0DQP3ZZ REPAIR RECTUM, PERCUTANEOUS APPROACH: ICD-10-PCS

## 2018-04-19 RX ADMIN — TAZOBACTAM SODIUM AND PIPERACILLIN SODIUM SCH: 375; 3 INJECTION, SOLUTION INTRAVENOUS at 14:20

## 2018-04-19 RX ADMIN — TAZOBACTAM SODIUM AND PIPERACILLIN SODIUM SCH MLS/HR: 375; 3 INJECTION, SOLUTION INTRAVENOUS at 09:21

## 2018-04-19 RX ADMIN — DEXTROSE AND SODIUM CHLORIDE SCH: 5; 450 INJECTION, SOLUTION INTRAVENOUS at 08:05

## 2018-04-19 RX ADMIN — TAZOBACTAM SODIUM AND PIPERACILLIN SODIUM SCH MLS/HR: 375; 3 INJECTION, SOLUTION INTRAVENOUS at 03:09

## 2018-04-19 NOTE — CP.PCM.DIS
Provider





- Provider


Date of Admission: 


04/17/18 14:32





Attending physician: 


Raoul Miranda MD








Diagnosis





- Discharge Diagnosis


(1) Perianal abscess


Status: Acute   





Hospital Course





- Lab Results


Lab Results: 


 Micro Results





04/17/18 12:00   Blood   Blood Culture - Preliminary


                            NO GROWTH AFTER 48 HOURS


04/17/18 11:30   Blood   Blood Culture - Preliminary


                            NO GROWTH AFTER 48 HOURS


04/17/18 Unknown   Abscess - Perirectal   Gram Stain - Final


04/17/18 Unknown   Abscess - Perirectal   Wound Culture - Preliminary


                                No growth.





 Most Recent Lab Values











WBC  14.0 K/uL (4.8-10.8)  H  04/18/18  06:40    


 


RBC  4.10 Mil/uL (4.40-5.90)  L  04/18/18  06:40    


 


Hgb  12.8 g/dL (12.0-18.0)  D 04/18/18  06:40    


 


Hct  37.2 % (35.0-51.0)   04/18/18  06:40    


 


MCV  90.8 fL (80.0-94.0)   04/18/18  06:40    


 


MCH  31.2 pg (27.0-31.0)  H  04/18/18  06:40    


 


MCHC  34.4 g/dL (33.0-37.0)   04/18/18  06:40    


 


RDW  12.7 % (11.5-14.5)   04/18/18  06:40    


 


Plt Count  227 K/uL (130-400)   04/18/18  06:40    


 


MPV  7.6 fL (7.2-11.7)   04/18/18  06:40    


 


Neut % (Auto)  70.8 % (50.0-75.0)   04/18/18  06:40    


 


Lymph % (Auto)  20.0 % (20.0-40.0)   04/18/18  06:40    


 


Mono % (Auto)  7.6 % (0.0-10.0)   04/18/18  06:40    


 


Eos % (Auto)  1.1 % (0.0-4.0)   04/18/18  06:40    


 


Baso % (Auto)  0.5 % (0.0-2.0)   04/18/18  06:40    


 


Neut # (Auto)  9.9 K/uL (1.8-7.0)  H  04/18/18  06:40    


 


Lymph # (Auto)  2.8 K/uL (1.0-4.3)   04/18/18  06:40    


 


Mono # (Auto)  1.1 K/uL (0.0-0.8)  H  04/18/18  06:40    


 


Eos # (Auto)  0.2 K/uL (0.0-0.7)   04/18/18  06:40    


 


Baso # (Auto)  0.1 K/uL (0.0-0.2)   04/18/18  06:40    


 


PT  13.4 SECONDS (9.7-12.2)  H  04/17/18  12:06    


 


INR  1.2   04/17/18  12:06    


 


APTT  30 SECONDS (21-34)   04/17/18  12:06    


 


Sodium  137 mmol/L (132-148)   04/18/18  06:40    


 


Potassium  3.8 mmol/L (3.6-5.2)   04/18/18  06:40    


 


Chloride  100 mmol/L ()   04/18/18  06:40    


 


Carbon Dioxide  26 mmol/L (22-30)   04/18/18  06:40    


 


Anion Gap  15  (10-20)   04/18/18  06:40    


 


BUN  12 mg/dL (9-20)   04/18/18  06:40    


 


Creatinine  1.1 mg/dL (0.8-1.5)   04/18/18  06:40    


 


Est GFR ( Amer)  > 60   04/18/18  06:40    


 


Est GFR (Non-Af Amer)  > 60   04/18/18  06:40    


 


Random Glucose  102 mg/dL ()   04/18/18  06:40    


 


Calcium  8.4 mg/dl (8.6-10.4)  L  04/18/18  06:40    


 


Total Bilirubin  1.1 mg/dL (0.2-1.3)   04/17/18  12:06    


 


AST  31 U/L (17-59)   04/17/18  12:06    


 


ALT  39 U/L (21-72)   04/17/18  12:06    


 


Alkaline Phosphatase  63 U/L ()   04/17/18  12:06    


 


Total Protein  8.7 g/dL (6.3-8.3)  H  04/17/18  12:06    


 


Albumin  4.1 g/dL (3.5-5.0)   04/17/18  12:06    


 


Globulin  4.5 gm/dL (2.2-3.9)  H  04/17/18  12:06    


 


Albumin/Globulin Ratio  0.9  (1.0-2.1)  L  04/17/18  12:06    


 


Urine Color  Yellow  (YELLOW)   04/17/18  14:05    


 


Urine Clarity  Clear  (Clear)   04/17/18  14:05    


 


Urine pH  5.0  (5.0-8.0)   04/17/18  14:05    


 


Ur Specific Gravity  1.040  (1.003-1.030)  H  04/17/18  14:05    


 


Urine Protein  1+ mg/dL (NEGATIVE)  H  04/17/18  14:05    


 


Urine Glucose (UA)  Normal mg/dL (Normal)   04/17/18  14:05    


 


Urine Ketones  Negative mg/dL (NEGATIVE)   04/17/18  14:05    


 


Urine Blood  Negative  (NEGATIVE)   04/17/18  14:05    


 


Urine Nitrate  Negative  (NEGATIVE)   04/17/18  14:05    


 


Urine Bilirubin  Negative  (NEGATIVE)   04/17/18  14:05    


 


Urine Urobilinogen  4.0 mg/dL (0.2-1.0)   04/17/18  14:05    


 


Ur Leukocyte Esterase  Neg Juan/uL (Negative)   04/17/18  14:05    


 


Urine WBC (Auto)  1 /hpf (0-5)   04/17/18  14:05    


 


Ur Squamous Epith Cells  1 /hpf (0-5)   04/17/18  14:05    














Discharge Exam





- Head Exam


Head Exam: NORMOCEPHALIC





Discharge Plan





- Follow Up Plan


Condition: FAIR


Disposition: HOME/ ROUTINE


Instructions:  Metronidazole (Systemic), Anal Abscess and Fistula (DC), Docusate

, Tramadol


Referrals: 


Raoul Miranda MD [Staff Provider] -

## 2018-04-19 NOTE — CARD
--------------- APPROVED REPORT --------------





EKG Measurement

Heart [id number]

CT 130P47

[id number]

[id number]

[id number]



<Conclusion>

Normal sinus rhythm

Incomplete right bundle branch block

Borderline ECG

## 2018-04-19 NOTE — OP
PROCEDURE DATE:  04/18/2018



PREOPERATIVE DIAGNOSIS:  Extensive ischiorectal and pelvic abscess

secondary to an infected anal fistula.



POSTOPERATIVE DIAGNOSIS:  Extensive ischiorectal and pelvic abscess

secondary to an infected anal fistula.



PROCEDURE PERFORMED:  Change of packing, redrainage of rectal and pelvic

abscess with debridement, and partial advancement flap closure.



SURGEON:  Raoul Miranda MD



ANESTHESIA:  General.



BLOOD LOSS:  40 mL.



POSTOPERATIVE CONDITION:  Stable.



DESCRIPTION OF PROCEDURE:  The patient was taken back to the operating

room.  General anesthesia was administered.  He was placed in lithotomy

position and the packing was removed, and the area was prepped and draped. 

There was fair amount of bleeding.  The packing was removed, and this area

was isolated, and the blood vessel was repaired.  In the pelvis, the wound

_____  was then pulse irrigated with saline and Kantrex solution and the

remaining  collections were drained and cultured.  Bleeding was controlled

using the Bovie.  Partial tissue flap closure was performed with Monocryl. 

Central portion of wound was again packed open with saline gauze.  The

patient tolerated the procedure well.  Returned to recovery room in stable

condition.





__________________________________________

Raoul Miranda MD





DD:  04/18/2018 16:18:45

DT:  04/18/2018 16:21:48

Job # [id number]

## 2018-04-20 NOTE — OP
PROCEDURE DATE:  04/19/2018.



PREOPERATIVE DIAGNOSIS:  Rectal and pelvic abscess.



POSTOPERATIVE DIAGNOSIS:  Rectal and pelvic abscess.



PROCEDURE PERFORMED:  Incision, drainage of rectal and pelvic abscess with

debridement and wound closure.



SURGEON:  Raoul Miranda MD.



ANESTHESIA:  General.



ESTIMATED BLOOD LOSS:  30 mL.



POSTOPERATIVE CONDITION:  Stable.



INDICATIONS FOR SURGERY:  This is a 43-year-old male who had an extensive

rectal and pelvic abscess, represents in a stage procedure will undergo

debridement and closure.



PROCEDURE:  The patient taken to the operating room, general anesthesia was

administered, placed lithotomy position.  The rectal area was prepped and

draped after the previous packing was removed.  The wound was aggressively

debrided and irrigated.  Bleeding was controlled using a Bovie and a larger

pelvic blood vessel was repaired.  The wound was irrigated.  Tissue flaps

were raised and an advancement flap closure was performed loosely.  The

patient tolerated procedure well, returned to recovery room in stable

condition.







__________________________________________

Raoul Miranda MD





DD:  04/19/2018 17:12:16

DT:  04/19/2018 17:15:07

Job # [id number]

## 2018-04-30 ENCOUNTER — HOSPITAL ENCOUNTER (INPATIENT)
Dept: HOSPITAL 31 - C.ER | Age: 43
LOS: 2 days | Discharge: HOME | DRG: 356 | End: 2018-05-02
Payer: COMMERCIAL

## 2018-04-30 DIAGNOSIS — K61.4: Primary | ICD-10-CM

## 2018-04-30 DIAGNOSIS — K65.1: ICD-10-CM

## 2018-04-30 DIAGNOSIS — J45.909: ICD-10-CM

## 2018-04-30 DIAGNOSIS — E78.00: ICD-10-CM

## 2018-04-30 PROCEDURE — 0HX9XZZ TRANSFER PERINEUM SKIN, EXTERNAL APPROACH: ICD-10-PCS

## 2018-04-30 PROCEDURE — 06QY0ZZ REPAIR LOWER VEIN, OPEN APPROACH: ICD-10-PCS

## 2018-04-30 PROCEDURE — 0D9Q0ZX DRAINAGE OF ANUS, OPEN APPROACH, DIAGNOSTIC: ICD-10-PCS

## 2018-04-30 RX ADMIN — DEXTROSE AND SODIUM CHLORIDE SCH MLS/HR: 5; 450 INJECTION, SOLUTION INTRAVENOUS at 18:54

## 2018-04-30 RX ADMIN — HYDROMORPHONE HYDROCHLORIDE PRN MG: 1 INJECTION, SOLUTION INTRAMUSCULAR; INTRAVENOUS; SUBCUTANEOUS at 15:55

## 2018-04-30 RX ADMIN — HYDROMORPHONE HYDROCHLORIDE PRN MG: 1 INJECTION, SOLUTION INTRAMUSCULAR; INTRAVENOUS; SUBCUTANEOUS at 16:07

## 2018-04-30 RX ADMIN — HYDROMORPHONE HYDROCHLORIDE PRN MG: 1 INJECTION, SOLUTION INTRAMUSCULAR; INTRAVENOUS; SUBCUTANEOUS at 16:24

## 2018-04-30 RX ADMIN — WATER SCH MLS/HR: 1 INJECTION INTRAMUSCULAR; INTRAVENOUS; SUBCUTANEOUS at 21:11

## 2018-04-30 RX ADMIN — HYDROMORPHONE HYDROCHLORIDE PRN MG: 1 INJECTION, SOLUTION INTRAMUSCULAR; INTRAVENOUS; SUBCUTANEOUS at 16:45

## 2018-04-30 NOTE — C.PDOC
History Of Present Illness


43-year-old male, presents to the emergency department for I&D of guillermo-rectal 

abscess by Dr Miranda. Patient states, he has had this abscess for the past 4-

5 days. Initially, it was drained by Dr Miranda, and was improving, but began 

draining again, prompting visit. Patient denies any fever, nausea/vomiting.


Time Seen by Provider: 04/30/18 10:51


Chief Complaint (Nursing): Abnormal Skin Integrity


History Per: Patient


History/Exam Limitations: no limitations





Past Medical History


Reviewed: Historical Data, Nursing Documentation, Vital Signs


Vital Signs: 


 Last Vital Signs











Temp  98 F   04/30/18 11:26


 


Pulse  78   04/30/18 11:26


 


Resp  18   04/30/18 11:26


 


BP  136/75   04/30/18 11:26


 


Pulse Ox  97   04/30/18 12:38














- Medical History


PMH: Asthma, Hypercholesterolemia


Surgical History: Appendectomy





- [facility] Procedures








DRAINAGE OF RECTUM, ENDO (04/17/18)


REPAIR RECTUM, PERCUTANEOUS APPROACH (04/17/18)








Family History: States: No Known Family Hx





- Social History


Hx Tobacco Use: No


Hx Alcohol Use: No


Hx Substance Use: No





- Immunization History


Hx Tetanus Toxoid Vaccination: Yes


Hx Influenza Vaccination: No


Hx Pneumococcal Vaccination: No





Review Of Systems


Constitutional: Negative for: Fever


Gastrointestinal: Positive for: Rectal Pain (abscess).  Negative for: Vomiting





Physical Exam





- Physical Exam


Appears: Non-toxic, No Acute Distress


Skin: Warm, Dry, No Rash


Head: Atraumatic, Normacephalic


Eye(s): bilateral: Normal Inspection, EOMI


Oral Mucosa: Moist


Lips: Normal Appearing


Neck: Normal ROM


Chest: Symmetrical


Cardiovascular: Rhythm Regular, No Murmur


Respiratory: Normal Breath Sounds, No Accessory Muscle Use


Gastrointestinal/Abdominal: Soft, No Tenderness


Rectal: No Hemorrhoids, Other (perianal erythema)


Extremity: Normal ROM, No Deformity, No Swelling


Neurological/Psych: Oriented x3, Normal Speech





ED Course And Treatment


O2 Sat by Pulse Oximetry: 97 (RA)


Pulse Ox Interpretation: Normal





Medical Decision Making


Medical Decision Making: 


Patient with guillermo-rectal abscess


[id number] Spoke with Dr Miranda who wants patient admitted to his service and will 

take patient to OR. Keep NPO





Disposition


Counseled Patient/Family Regarding: Diagnosis, Need For Followup





- Disposition


Disposition: HOSPITALIZED


Disposition Time: 11:13


Condition: STABLE





- POA


Present On Arrival: None





- Clinical Impression


Clinical Impression: 


 Guillermo-rectal abscess








- Scribe Statement


The provider has reviewed the documentation as recorded by the Scribe (Joao Taylor)








All medical record entries made by the Scribe were at my direction and 

personally dictated by me. I have reviewed the chart and agree that the record 

accurately reflects my personal performance of the history, physical exam, 

medical decision making, and the department course for this patient. I have 

also personally directed, reviewed, and agree with the discharge instructions 

and disposition.





Decision To Admit





- Pt Status Changed To:


Hospital Disposition Of: Inpatient





- Admit Certification


Admit to Inpatient:: After my assessment, the patient will require 

hospitalization for at least two midnights.  This is because of the severity of 

symptoms shown, intensity of services needed, and/or the medical risk in this 

patient being treated as an outpatient.





- InPatient:


Physician Admission Certification: I certify that this patient requires 2 or 

more midnights of care for the following reason:: Patient with recurrent guillermo-

rectal abscess and needs surgical drainage





- .


Bed Request Type: Regular


Admitting Physician: Raoul Miranda


Patient Diagnosis: 


 Guillermo-rectal abscess

## 2018-05-01 LAB
BASOPHILS # BLD AUTO: 0 K/UL (ref 0–0.2)
BASOPHILS NFR BLD: 0.2 % (ref 0–2)
BUN SERPL-MCNC: 17 MG/DL (ref 9–20)
CALCIUM SERPL-MCNC: 9 MG/DL (ref 8.6–10.4)
EOSINOPHIL # BLD AUTO: 0 K/UL (ref 0–0.7)
EOSINOPHIL NFR BLD: 0 % (ref 0–4)
ERYTHROCYTE [DISTWIDTH] IN BLOOD BY AUTOMATED COUNT: 13.1 % (ref 11.5–14.5)
GFR NON-AFRICAN AMERICAN: > 60
HGB BLD-MCNC: 14.3 G/DL (ref 12–18)
LYMPHOCYTES # BLD AUTO: 2.2 K/UL (ref 1–4.3)
LYMPHOCYTES NFR BLD AUTO: 13.4 % (ref 20–40)
MCH RBC QN AUTO: 31.6 PG (ref 27–31)
MCHC RBC AUTO-ENTMCNC: 34.8 G/DL (ref 33–37)
MCV RBC AUTO: 90.8 FL (ref 80–94)
MONOCYTES # BLD: 0.4 K/UL (ref 0–0.8)
MONOCYTES NFR BLD: 2.3 % (ref 0–10)
NEUTROPHILS # BLD: 13.6 K/UL (ref 1.8–7)
NEUTROPHILS NFR BLD AUTO: 84.1 % (ref 50–75)
NRBC BLD AUTO-RTO: 0.1 % (ref 0–2)
PLATELET # BLD: 391 K/UL (ref 130–400)
PMV BLD AUTO: 7.8 FL (ref 7.2–11.7)
RBC # BLD AUTO: 4.53 MIL/UL (ref 4.4–5.9)
WBC # BLD AUTO: 16.1 K/UL (ref 4.8–10.8)

## 2018-05-01 PROCEDURE — 06QY0ZZ REPAIR LOWER VEIN, OPEN APPROACH: ICD-10-PCS

## 2018-05-01 PROCEDURE — 0HX9XZZ TRANSFER PERINEUM SKIN, EXTERNAL APPROACH: ICD-10-PCS

## 2018-05-01 PROCEDURE — 0W9J0ZX DRAINAGE OF PELVIC CAVITY, OPEN APPROACH, DIAGNOSTIC: ICD-10-PCS

## 2018-05-01 PROCEDURE — 0D9Q0ZX DRAINAGE OF ANUS, OPEN APPROACH, DIAGNOSTIC: ICD-10-PCS

## 2018-05-01 RX ADMIN — DEXTROSE AND SODIUM CHLORIDE SCH MLS/HR: 5; 450 INJECTION, SOLUTION INTRAVENOUS at 20:52

## 2018-05-01 RX ADMIN — WATER SCH MLS/HR: 1 INJECTION INTRAMUSCULAR; INTRAVENOUS; SUBCUTANEOUS at 21:00

## 2018-05-01 RX ADMIN — BUPIVACAINE HYDROCHLORIDE ONE ML: 2.5 INJECTION, SOLUTION EPIDURAL; INFILTRATION; INTRACAUDAL; PERINEURAL at 14:35

## 2018-05-01 RX ADMIN — WATER SCH: 1 INJECTION INTRAMUSCULAR; INTRAVENOUS; SUBCUTANEOUS at 20:53

## 2018-05-01 RX ADMIN — DEXTROSE AND SODIUM CHLORIDE SCH MLS/HR: 5; 450 INJECTION, SOLUTION INTRAVENOUS at 06:00

## 2018-05-01 RX ADMIN — DEXTROSE AND SODIUM CHLORIDE SCH: 5; 450 INJECTION, SOLUTION INTRAVENOUS at 12:35

## 2018-05-01 RX ADMIN — WATER SCH MLS/HR: 1 INJECTION INTRAMUSCULAR; INTRAVENOUS; SUBCUTANEOUS at 06:01

## 2018-05-01 NOTE — OP
PROCEDURE DATE:  04/30/2018



PREOPERATIVE DIAGNOSIS:       Rectal abscess with fistula.



POSTOPERATIVE DIAGNOSIS:  Rectal abscess with fistula.



PROCEDURE PERFORMED:  Incision and drainage of new rectal and pelvic

abscess with fistula.



SURGEON:  Raoul Miranda MD



ANESTHESIA:  General.



BLOOD LOSS:  30 mL.



POSTOPERATIVE CONDITION:  Stable.



INDICATIONS FOR SURGERY:  This is a 43-year-old male, 2 weeks status post a

perianal fistulotomy for anal fistula and a small abscess.  He now

represents with a new abscess at the 8 o'clock level near the anus for

incision and drainage.



GROSS FINDINGS:  There was an abscess at the 8 o'clock level of the anus

which extended into the pelvic space and also went to the anal canal

underneath the intersphincteric type fistula.



PROCEDURE IN DETAIL:  The patient was taken to the operating room, general

anesthesia administered, placed in lithotomy position.  The rectal area was

prepped and draped.  The abscess was drained and cultured.  A probe was

passed into the anus.  The overlying tissue was divided.  Bleeding was

controlled using Bovie, and a larger blood vessel was repaired.  The

abscess was then traced up into the levator space which was also drained. 

The wound was irrigated with copious amounts of saline solution, and

partial tissue transfer closure was performed.  The central portion of

wound was packed open with wet saline gauze.  The patient tolerated the

procedure well.  Returned to recovery room in stable condition.





__________________________________________

Raoul Miranda MD





DD:  04/30/2018 16:05:07

DT:  04/30/2018 16:10:09

Job # [id number]

## 2018-05-02 VITALS — TEMPERATURE: 98.2 F | DIASTOLIC BLOOD PRESSURE: 74 MMHG | HEART RATE: 78 BPM | SYSTOLIC BLOOD PRESSURE: 113 MMHG

## 2018-05-02 VITALS — RESPIRATION RATE: 20 BRPM

## 2018-05-02 VITALS — OXYGEN SATURATION: 96 %

## 2018-05-02 RX ADMIN — WATER SCH MLS/HR: 1 INJECTION INTRAMUSCULAR; INTRAVENOUS; SUBCUTANEOUS at 13:16

## 2018-05-02 RX ADMIN — DEXTROSE AND SODIUM CHLORIDE SCH MLS/HR: 5; 450 INJECTION, SOLUTION INTRAVENOUS at 05:53

## 2018-05-02 RX ADMIN — BUPIVACAINE HYDROCHLORIDE ONE ML: 2.5 INJECTION, SOLUTION EPIDURAL; INFILTRATION; INTRACAUDAL; PERINEURAL at 17:00

## 2018-05-02 RX ADMIN — DEXTROSE AND SODIUM CHLORIDE SCH: 5; 450 INJECTION, SOLUTION INTRAVENOUS at 10:43

## 2018-05-02 RX ADMIN — WATER SCH MLS/HR: 1 INJECTION INTRAMUSCULAR; INTRAVENOUS; SUBCUTANEOUS at 05:52

## 2018-05-02 NOTE — OP
PROCEDURE DATE:  05/01/2018



PREOPERATIVE DIAGNOSIS:  Rectal and pelvic abscess with fistula.



POSTOPERATIVE DIAGNOSIS:  Rectal and pelvic abscess with fistula.



PROCEDURE PERFORMED:  Re-drainage of rectal and pelvic abscess with repair

of bleeding blood vessel drainage and partial adjacent tissue transfer

closure.



SURGEON:  Raoul Miranda MD



ANESTHESIA:  General.



BLOOD LOSS:  30 mL.



POSTOP CONDITION:  Stable.



INDICATIONS FOR SURGERY:  This 43-year-old male with recurrent deep rectal

and pelvic abscess, taken back to the OR for change in the packing.



DESCRIPTION OF PROCEDURE:  The patient was taken to the operating room and

general anesthesia was administered, placed in lithotomy position.  Packing

was removed.  There was fair amount of bleeding noted from the wound. 

Wound was quickly prepped and draped and a bleeding blood vessel was

controlled and repaired with Prolene and silk.  The wound was then pulse

irrigated with saline and Kantrex solution.  Remaining debridement and

drainage of any remaining collection was then carried out.  Partial tissue

transfer closure was performed with Monocryl and the central portion of the

wound was packed open with wet saline gauze.  The patient tolerated the

procedure well and returned to the recovery room in stable condition.





__________________________________________

Raoul Miranda MD



DD:  05/01/2018 15:02:11

DT:  05/01/2018 15:05:52

Job # [id number]

## 2018-05-03 NOTE — OP
PROCEDURE DATE:  05/02/2018



PREOPERATIVE DIAGNOSIS:  Extensive rectal and pelvic abscess with anal

fistula.



POSTOPERATIVE DIAGNOSIS:  Extensive rectal and pelvic abscess with anal

fistula.



PROCEDURE PERFORMED:  Re-drainage of extensive rectal and pelvic abscess

with anal fissure and tissue flap closure.



SURGEON:  Raoul Miranda MD.



ANESTHESIA:  General.



ESTIMATED BLOOD LOSS:  40 mL.



POSTOP CONDITION:  Stable.



INDICATION:  The patient was taken back to the operating room in a stage

procedure for pulse irrigation, debridement and closure of his wound.



DESCRIPTION OF PROCEDURE:  The patient was taken back to the operating

room, general anesthesia was administered, placed in lithotomy position. 

The packing from the wound was removed.  It was prepped and draped and

pulse irrigated with 2 L of saline solution.  After it was completely

cleansed, flaps were raised using Monocryl and advancement flap closure was

performed very loosely.  Any remaining collections were drained and

cultured.  The patient tolerated the procedure well and returned to the

recovery room in stable condition.







__________________________________________

Raoul Miranda MD





DD:  05/02/2018 17:15:06

DT:  05/02/2018 23:05:36

Job # [id number]

## 2018-05-14 ENCOUNTER — HOSPITAL ENCOUNTER (INPATIENT)
Dept: HOSPITAL 31 - C.ER | Age: 43
LOS: 4 days | Discharge: HOME | DRG: 577 | End: 2018-05-18
Payer: COMMERCIAL

## 2018-05-14 VITALS — BODY MASS INDEX: 36.6 KG/M2

## 2018-05-14 DIAGNOSIS — J45.909: ICD-10-CM

## 2018-05-14 DIAGNOSIS — E78.00: ICD-10-CM

## 2018-05-14 DIAGNOSIS — L05.01: Primary | ICD-10-CM

## 2018-05-14 DIAGNOSIS — I82.4Z2: ICD-10-CM

## 2018-05-14 DIAGNOSIS — L02.212: ICD-10-CM

## 2018-05-14 DIAGNOSIS — K21.9: ICD-10-CM

## 2018-05-14 DIAGNOSIS — Z90.49: ICD-10-CM

## 2018-05-14 DIAGNOSIS — F17.210: ICD-10-CM

## 2018-05-14 LAB
ALBUMIN SERPL-MCNC: 4.3 G/DL (ref 3.5–5)
ALBUMIN/GLOB SERPL: 1.1 {RATIO} (ref 1–2.1)
ALT SERPL-CCNC: 36 U/L (ref 21–72)
APTT BLD: 30 SECONDS (ref 21–34)
AST SERPL-CCNC: 29 U/L (ref 17–59)
BASOPHILS # BLD AUTO: 0.1 K/UL (ref 0–0.2)
BASOPHILS NFR BLD: 0.4 % (ref 0–2)
BILIRUB UR-MCNC: NEGATIVE MG/DL
BUN SERPL-MCNC: 13 MG/DL (ref 9–20)
CALCIUM SERPL-MCNC: 9.5 MG/DL (ref 8.6–10.4)
EOSINOPHIL # BLD AUTO: 0.1 K/UL (ref 0–0.7)
EOSINOPHIL NFR BLD: 0.6 % (ref 0–4)
EOSINOPHIL NFR BLD: 1 % (ref 0–4)
ERYTHROCYTE [DISTWIDTH] IN BLOOD BY AUTOMATED COUNT: 14.1 % (ref 11.5–14.5)
GFR NON-AFRICAN AMERICAN: > 60
GLUCOSE UR STRIP-MCNC: NORMAL MG/DL
HGB BLD-MCNC: 16.2 G/DL (ref 12–18)
INR PPP: 1.1
LEUKOCYTE ESTERASE UR-ACNC: (no result) LEU/UL
LIPASE: 123 U/L (ref 23–300)
LYMPHOCYTE: 3 % (ref 20–40)
LYMPHOCYTES # BLD AUTO: 0.6 K/UL (ref 1–4.3)
LYMPHOCYTES NFR BLD AUTO: 4.5 % (ref 20–40)
MCH RBC QN AUTO: 31.7 PG (ref 27–31)
MCHC RBC AUTO-ENTMCNC: 34.6 G/DL (ref 33–37)
MCV RBC AUTO: 91.5 FL (ref 80–94)
MONOCYTE: 3 % (ref 0–10)
MONOCYTES # BLD: 0.3 K/UL (ref 0–0.8)
MONOCYTES NFR BLD: 2.4 % (ref 0–10)
NEUTROPHILS # BLD: 13.2 K/UL (ref 1.8–7)
NEUTROPHILS NFR BLD AUTO: 84 % (ref 50–75)
NEUTROPHILS NFR BLD AUTO: 92.1 % (ref 50–75)
NEUTS BAND NFR BLD: 9 % (ref 0–2)
NRBC BLD AUTO-RTO: 0 % (ref 0–2)
PH UR STRIP: 5 [PH] (ref 5–8)
PLATELET # BLD EST: NORMAL 10*3/UL
PLATELET # BLD: 195 K/UL (ref 130–400)
PMV BLD AUTO: 8.1 FL (ref 7.2–11.7)
PROT UR STRIP-MCNC: (no result) MG/DL
PROTHROMBIN TIME: 11.7 SECONDS (ref 9.7–12.2)
RBC # BLD AUTO: 5.13 MIL/UL (ref 4.4–5.9)
RBC # UR STRIP: (no result) /UL
SP GR UR STRIP: 1.02 (ref 1–1.03)
SQUAMOUS EPITHIAL: 1 /HPF (ref 0–5)
STOMATOCYTES BLD QL SMEAR: SLIGHT
TOTAL CELLS COUNTED BLD: 100
UROBILINOGEN UR-MCNC: NORMAL MG/DL (ref 0.2–1)
WBC # BLD AUTO: 14.3 K/UL (ref 4.8–10.8)

## 2018-05-14 RX ADMIN — TAZOBACTAM SODIUM AND PIPERACILLIN SODIUM SCH MLS/HR: 375; 3 INJECTION, SOLUTION INTRAVENOUS at 22:47

## 2018-05-14 NOTE — RAD
PROCEDURE:  Radiographs of the chest and abdomen (obstructive series)



HISTORY:

pain  



COMPARISON:

Correlations made to CT scan of the abdomen and pelvis dated 

03/08/2014 and chest radiograph dated 02/24/2014.



TECHNIQUE:

AP radiograph of the chest, with upright and supine radiographs of 

the abdomen.



FINDINGS:



CHEST:

Lungs: Clear.



Cardiovascular: Normal size heart. No pulmonary vascular congestion.



Pleura: No pleural fluid. No pneumothorax.



Other findings: None.



ABDOMEN AND PELVIS:

Bowel: Unremarkable bowel gas pattern. No evidence of mechanical 

obstruction.



Free air: None.



Bones:  Unremarkable.



Other findings: None.



IMPRESSION:

Unremarkable radiographs of chest and abdomen. No evidence of 

mechanical bowel obstruction.

## 2018-05-14 NOTE — C.PDOC
History Of Present Illness


43-year-old male, presents to the emergency department with complaints of 

abdominal pain that is associated with non-bloody/non-bilious vomiting that 

started last night. Patient also says he has a cyst to rectal area that was I&D 

x 2  by Dr Miranda last month. (+) chills .Patient denies fever, sob, chest 

pain, back pain ,headache, or neck pain. 


Time Seen by Provider: 05/14/18 11:07


Chief Complaint (Nursing): GI Problem


History Per: Patient


History/Exam Limitations: no limitations


Current Symptoms Are (Timing): Still Present


Severity: Moderate





Past Medical History


Reviewed: Historical Data, Nursing Documentation, Vital Signs


Vital Signs: 


 Last Vital Signs











Temp  98.4 F   05/18/18 07:00


 


Pulse  76   05/18/18 07:00


 


Resp  20   05/18/18 07:00


 


BP  106/68   05/18/18 07:00


 


Pulse Ox  95   05/18/18 07:00














- Medical History


PMH: Asthma (NEVER HOSPITALIZED), Hypercholesterolemia


Surgical History: Appendectomy





- [facility] Procedures








DRAINAGE OF ANUS, OPEN APPROACH, DIAGNOSTIC (04/30/18)


DRAINAGE OF PELVIC CAVITY, OPEN APPROACH, DIAGNOSTIC (04/30/18)


DRAINAGE OF RECTUM, ENDO (04/17/18)


REPAIR LOWER VEIN, OPEN APPROACH (04/30/18)


REPAIR RECTUM, PERCUTANEOUS APPROACH (04/17/18)


TRANSFER PERINEUM SKIN, EXTERNAL APPROACH (04/30/18)








Family History: States: No Known Family Hx





- Social History


Hx Tobacco Use: No


Hx Alcohol Use: Yes (Socially)


Hx Substance Use: No





- Immunization History


Hx Tetanus Toxoid Vaccination: Yes


Hx Influenza Vaccination: No


Hx Pneumococcal Vaccination: No





Review Of Systems


Constitutional: Negative for: Fever


Gastrointestinal: Positive for: Vomiting, Abdominal Pain


Neurological: Negative for: Weakness, Numbness, Headache, Dizziness





Physical Exam





- Physical Exam


Appears: Non-toxic, Other (uncomfortable)


Skin: Warm, Dry, No Rash


Head: Atraumatic, Normacephalic


Eye(s): bilateral: Normal Inspection, EOMI


Nose: Normal


Oral Mucosa: Moist


Lips: Normal Appearing


Neck: Normal ROM


Chest: Symmetrical


Cardiovascular: Rhythm Regular


Respiratory: Normal Breath Sounds, No Accessory Muscle Use


Gastrointestinal/Abdominal: Soft, Tenderness (diffuse), Distention, Other (

protuberant abdomen)


Rectal: Tenderness (TTP and erythema to left gluteal cleft)


Back: No CVA Tenderness, No Vertebral Tenderness


Extremity: Normal ROM, No Deformity, No Swelling


Neurological/Psych: Oriented x3, Normal Speech





ED Course And Treatment





- Laboratory Results


Result Diagrams: 


 05/14/18 12:01





 05/14/18 12:01


O2 Sat by Pulse Oximetry: 100 (RA)


Pulse Ox Interpretation: Normal





- CT Scan/US


  ** CT ABD


Other Rad Studies (CT/US): Read By Radiologist, Radiology Report Reviewed


CT/US Interpretation: Accession No. : [id number].  Patient Name / ID : 

DEYA LAM  / [id number].  Exam Date : 05/14/2018 13:36:41 ( Approved ).  

Study Comment :  Sex / Age : M  / 43Y.  Creator : April Bernal.  Dictator : 

Jose Chin MD.   :  Approver : Jose Chin MD.  

Approver2 :  Report Date : 05/14/2018 13:46:38.  My Comment :  *****************

******************************************************************.  PROCEDURE:

  CT Abdomen and Pelvis with contrast.  HISTORY:  pain.  COMPARISON:  CT scan 

of the pelvis dated 04/17/2018 ; CT scan of the abdomen and pelvis dated 03/08/ 2014.  TECHNIQUE:  Contrast dose: 100 mL Visipaque 320.  Radiation dose:  Total 

exam DLP = 1275.3 mGy-cm.  This CT exam was performed using one or more of the 

following dose reduction techniques: Automated exposure control, adjustment of 

the mA and/or kV according to patient size, and/or use of iterative 

reconstruction technique.  FINDINGS:  LOWER THORAX:  Bibasilar atelectasis.  

LIVER:  Hepatic steatosis. No gross lesion or ductal dilatation.  GALLBLADDER 

AND BILE DUCTS:  Unremarkable.  PANCREAS:  Unremarkable. No gross lesion or 

ductal dilatation.  SPLEEN:  Unremarkable.  ADRENALS:  Unremarkable. No mass.  

KIDNEYS AND URETERS:  Unremarkable. No hydronephrosis. No solid mass.  

VASCULATURE:  Unremarkable. No aortic aneurysm.  BOWEL:  Unremarkable. No 

obstruction. No gross mural thickening.  APPENDIX:  Prior appendectomy.  

PERITONEUM:  Fat containing right inguinal hernia. Small fat containing 

umbilical hernia. No free fluid. No free air.  LYMPH NODES:  Unremarkable. No 

enlarged lymph nodes.  BLADDER:  Unremarkable.  REPRODUCTIVE:  Unremarkable.  

BONES:  No acute fracture.  OTHER FINDINGS:  None.  IMPRESSION:  No acute 

abdominal pelvic pathology.


Progress Note: CT Abd/Pel, bloodwork and UA ordered and reviewed.  On re-

evaluation, pt notes abdominal pain resolved and he feels better. Tolerating 

PO.  Case discussed with Dr Miranda who notes admission to his service and Dr Lozoya for ID consult.





Disposition





- Disposition


Disposition: HOSPITALIZED


Disposition Time: 16:00


Condition: STABLE





- Clinical Impression


Clinical Impression: 


 Abdominal pain, Pilonidal cyst with abscess








- Scribe Statement


The provider has reviewed the documentation as recorded by the Scribe (Joao Taylor)








All medical record entries made by the Scribe were at my direction and 

personally dictated by me. I have reviewed the chart and agree that the record 

accurately reflects my personal performance of the history, physical exam, 

medical decision making, and the department course for this patient. I have 

also personally directed, reviewed, and agree with the discharge instructions 

and disposition.

## 2018-05-15 PROCEDURE — 0JB70ZZ EXCISION OF BACK SUBCUTANEOUS TISSUE AND FASCIA, OPEN APPROACH: ICD-10-PCS

## 2018-05-15 RX ADMIN — TAZOBACTAM SODIUM AND PIPERACILLIN SODIUM SCH MLS/HR: 375; 3 INJECTION, SOLUTION INTRAVENOUS at 04:10

## 2018-05-15 RX ADMIN — HYDROMORPHONE HYDROCHLORIDE PRN MG: 1 INJECTION, SOLUTION INTRAMUSCULAR; INTRAVENOUS; SUBCUTANEOUS at 14:43

## 2018-05-15 RX ADMIN — TAZOBACTAM SODIUM AND PIPERACILLIN SODIUM SCH MLS/HR: 375; 3 INJECTION, SOLUTION INTRAVENOUS at 11:00

## 2018-05-15 RX ADMIN — TAZOBACTAM SODIUM AND PIPERACILLIN SODIUM SCH MLS/HR: 375; 3 INJECTION, SOLUTION INTRAVENOUS at 16:07

## 2018-05-15 RX ADMIN — TAZOBACTAM SODIUM AND PIPERACILLIN SODIUM SCH MLS/HR: 375; 3 INJECTION, SOLUTION INTRAVENOUS at 22:26

## 2018-05-15 RX ADMIN — HYDROMORPHONE HYDROCHLORIDE PRN MG: 1 INJECTION, SOLUTION INTRAMUSCULAR; INTRAVENOUS; SUBCUTANEOUS at 14:13

## 2018-05-15 NOTE — CP.PCM.CON
History of Present Illness





- History of Present Illness


History of Present Illness: 





43-year-old male, presents to the emergency department with complaints of 

abdominal pain that is associated with non-bloody/non-bilious vomiting that 

started last night. Patient also says he has a cyst to rectal area that was I&D 

x 2  by Dr Miranda last month. (+) chills .Patient denies fever, sob, chest 

pain, back pain ,headache, or neck pain. 








c/o fever and chills  also has left leg pain and tenderness  - no palpable cord 

or edema 


will send for stat venous doppler








- Medical History


PMH: Asthma (NEVER HOSPITALIZED), Hypercholesterolemia


Surgical History: Appendectomy





- [facility] Procedures








DRAINAGE OF ANUS, OPEN APPROACH, DIAGNOSTIC (04/30/18)


DRAINAGE OF PELVIC CAVITY, OPEN APPROACH, DIAGNOSTIC (04/30/18)


DRAINAGE OF RECTUM, ENDO (04/17/18)


REPAIR LOWER VEIN, OPEN APPROACH (04/30/18)


REPAIR RECTUM, PERCUTANEOUS APPROACH (04/17/18)


TRANSFER PERINEUM SKIN, EXTERNAL APPROACH (04/30/18)








Review of Systems





- Review of Systems


All systems: reviewed and no additional remarkable complaints except





- Constitutional


Constitutional: As Per HPI





- EENT


Eyes: absent: As Per HPI, Blind Spots, Blurred Vision, Change in Vision, 

Decreased Night Vision, Diplopia, Discharge, Dry Eye, Exophthalmos, Floaters, 

Irritation, Itchy Eyes, Loss of Peripheral Vision, Pain, Photophobia, Requires 

Corrective Lenses, Sees Flashes, Spots in Vision, Tunnel Vision, Other Visual 

Disturbances, Loss of Vision, Other


Ears: absent: As Per HPI, Decreased Hearing, Ear Discharge, Ear Pain, Tinnitus, 

Abnormal Hearing, Disequilibrium, Dizziness, Other


Nose/Mouth/Throat: absent: As Per HPI, Epistaxis, Nasal Congestion, Nasal 

Discharge, Nasal Obstruction, Nasal Trauma, Nose Pain, Post Nasal Drip, Sinus 

Pain, Sinus Pressure, Bleeding Gums, Change in Voice, Dental Pain, Dry Mouth, 

Dysphagia, Halitosis, Hoarsness, Lip Swelling, Mouth Lesions, Mouth Pain, 

Odynophagia, Sore Throat, Throat Swelling, Tongue Swelling, Facial Pain, Neck 

Pain, Neck Mass, Other





- Cardiovascular


Cardiovascular: absent: As Per HPI, Acrocyanosis, Chest Pain, Chest Pain at Rest

, Chest Pain with Activity, Claudication, Diaphoresis, Dyspnea, Dyspnea on 

Exertion, Edema, Irregular Heart Rhythm, Pain Radiating to Arm/Neck/Jaw, Leg 

Edema, Leg Ulcers, Lightheadedness, Orthopnea, Palpitations, Paroxysmal 

Nocturnal Dyspnea, Pedal Edema, Radiating Pain, Rapid Heart Rate, Slow Heart 

Rate, Syncope, Other





- Respiratory


Respiratory: absent: As Per HPI, Cough, Dyspnea, Hemoptysis, Dyspnea on Exertion

, Wheezing, Snoring, Stridor, Pain on Inspiration, Chest Congestion, Excessive 

Mucous Production, Change in Mucous Color, Pain with Coughing, Other





- Gastrointestinal


Gastrointestinal: absent: As Per HPI, Abdominal Pain, Belching, Bloating, 

Change in Bowel Habits, Change in Stool Character, Coffee Ground Emesis, 

Constipation, Cramping, Diarrhea, Dyspepsia, Dysphagia, Early Satiety, 

Excessive Flatus, Fecal Incontinence, Heartburn, Hematemesis, Hematochezia, 

Loose Stools, Melena, Nausea, Odynophagia, Temesmus, Vomiting, Other





- Genitourinary


Genitourinary: absent: As Per HPI, Change in Urinary Stream, Difficulty 

Urinating, Dysuria, Flank Pain, Hematuria, Pyuria, Nocturia, Urinary 

Incontinence, Urinary Frequency, Urinary Hesitance, Urinary Urgency, Voiding 

Freq/Small Amts, Freq UTI, Hx Renal/Bladder Calculi, Hx /Renal Surgery, 

Bladder Distension, Other





- Musculoskeletal


Musculoskeletal: As Per HPI





- Integumentary


Integumentary: As Per HPI





- Neurological


Neurological: absent: As Per HPI, Abnormal Gait, Abnormal Hearing, Abnormal 

Movements, Abnormal Speech, Behavioral Changes, Burning Sensations, Confusion, 

Convulsions, Disequilibrium, Dizziness, Numbness, Focal Weakness, Frequent Falls

, Headaches, Lack of Coordination, Loss of Vision, Memory Loss, Paresthesias, 

Radicular Pain, Restless Legs, Sensory Deficit, Syncope, Tingling, Tremor, 

Vertigo, Weakness, Other Visual Disturbances, Other





- Psychiatric


Psychiatric: absent: As Per HPI, Abnormal Sleep Pattern, Anhedonia, Anxiety, 

Auditory Hallucinations, Behavioral Changes, Change in Appetite, Change in 

Libido, Confusion, Depression, Difficulty Concentrating, Hallucinations, 

Homicidal Ideation, Hopelessness, Irritability, Memory Loss, Mood Swings, Panic 

Attacks, Paranoia, Suicidal Ideation, Visual Hallucinations, Tactile 

Hallucinations, Other





Past Patient History





- Past Social History


Smoking Status: Light Smoker < 10 Cigarettes Daily





- CARDIAC


Hx Hypercholesterolemia: Yes





- PULMONARY


Hx Asthma: Yes (NEVER HOSPITALIZED)





- NEUROLOGICAL


Hx Neurological Disorder: No





- HEENT


Hx HEENT Problems: No





- RENAL


Hx Chronic Kidney Disease: No





- ENDOCRINE/METABOLIC


Hx Endocrine Disorders: No





- HEMATOLOGICAL/ONCOLOGICAL


Hx Blood Disorders: No





- INTEGUMENTARY


Hx Dermatological Problems: Yes





- MUSCULOSKELETAL/RHEUMATOLOGICAL


Hx Musculoskeletal Disorders: No


Hx Falls: No





- GASTROINTESTINAL


Hx Gastrointestinal Disorders: No





- GENITOURINARY/GYNECOLOGICAL


Hx Genitourinary Disorders: No





- PSYCHIATRIC


Hx Substance Use: No





- SURGICAL HISTORY


Hx Appendectomy: Yes





- ANESTHESIA


Hx Anesthesia: Yes


Hx Anesthesia Reactions: No


Hx Malignant Hyperthermia: No





Meds


Allergies/Adverse Reactions: 


 Allergies











Allergy/AdvReac Type Severity Reaction Status Date / Time


 


No Known Allergies Allergy   Verified 05/14/18 10:48














- Medications


Medications: 


 Current Medications





Sodium Chloride (Sodium Chloride 0.9%)  1,000 mls @ 100 mls/hr IV .Q10H [facility]


   Last Admin: 05/15/18 05:54 Dose:  100 mls/hr


Piperacillin Sod/Tazobactam Sod (Zosyn 3.375 Gm Iv Premix)  3.375 gm in 50 mls 

@ 100 mls/hr IVPB Q6H FRANCIS


   PRN Reason: Protocol


   Last Admin: 05/15/18 11:00 Dose:  100 mls/hr


Ketorolac Tromethamine (Toradol)  30 mg IVP Q6 PRN


   PRN Reason: Pain, moderate (4-7)


   Last Admin: 05/15/18 05:58 Dose:  30 mg











Physical Exam





- Constitutional


Appears: Non-toxic, Chronically Ill





- Head Exam


Head Exam: NORMOCEPHALIC





- Eye Exam


Eye Exam: PERRL





- ENT Exam


ENT Exam: Mucous Membranes Dry





- Neck Exam


Neck exam: Negative for: Lymphadenopathy





- Respiratory Exam


Respiratory Exam: Decreased Breath Sounds





- Cardiovascular Exam


Cardiovascular Exam: REGULAR RHYTHM





- GI/Abdominal Exam


GI & Abdominal Exam: Diminished Bowel Sounds, Soft





- Rectal Exam


Rectal Exam: Deferred





-  Exam


 Exam: NORMAL INSPECTION





- Extremities Exam


Extremities exam: Negative for: pedal edema





- Back Exam


Back exam: absent: CVA tenderness (L), CVA tenderness (R)





- Neurological Exam


Neurological exam: Alert, CN II-XII Intact, Oriented x3, Reflexes Normal





- Psychiatric Exam


Psychiatric exam: Depressed





Results





- Vital Signs


Recent Vital Signs: 


 Last Vital Signs











Temp  97.7 F   05/15/18 07:42


 


Pulse  78   05/15/18 07:42


 


Resp  20   05/15/18 07:42


 


BP  96/68 L  05/15/18 07:42


 


Pulse Ox  96   05/15/18 07:42














- Labs


Result Diagrams: 


 05/14/18 12:01





 05/14/18 12:01


Labs: 


 Laboratory Results - last 24 hr











  05/14/18 05/14/18 05/14/18





  12:01 12:01 12:53


 


Neutrophils % (Manual)  84 H  


 


Band Neutrophils %  9 H  


 


Lymphocytes % (Manual)  3 L  


 


Monocytes % (Manual)  3  


 


Eosinophils % (Manual)  1  


 


Platelet Estimate  Normal  


 


Stomatocytes  Slight  


 


Sodium   147 


 


Potassium   4.9 


 


Chloride   105 


 


Carbon Dioxide   24 


 


Anion Gap   22 H 


 


BUN   13 


 


Creatinine   1.0 


 


Est GFR ( Amer)   > 60 


 


Est GFR (Non-Af Amer)   > 60 


 


POC Glucose (mg/dL)   


 


Random Glucose   147 H 


 


Calcium   9.5 


 


Total Bilirubin   1.1 


 


AST   29 


 


ALT   36 


 


Alkaline Phosphatase   67 


 


Total Protein   8.2 


 


Albumin   4.3 


 


Globulin   3.9 


 


Albumin/Globulin Ratio   1.1 


 


Lipase   123 


 


Urine Color    Yellow


 


Urine Clarity    Clear


 


Urine pH    5.0


 


Ur Specific Gravity    1.025


 


Urine Protein    1+ H


 


Urine Glucose (UA)    Normal


 


Urine Ketones    Negative


 


Urine Blood    1+ H


 


Urine Nitrate    Negative


 


Urine Bilirubin    Negative


 


Urine Urobilinogen    Normal


 


Ur Leukocyte Esterase    Neg


 


Urine WBC (Auto)    1


 


Urine RBC (Auto)    8 H


 


Ur Squamous Epith Cells    1














  05/14/18





  22:49


 


Neutrophils % (Manual) 


 


Band Neutrophils % 


 


Lymphocytes % (Manual) 


 


Monocytes % (Manual) 


 


Eosinophils % (Manual) 


 


Platelet Estimate 


 


Stomatocytes 


 


Sodium 


 


Potassium 


 


Chloride 


 


Carbon Dioxide 


 


Anion Gap 


 


BUN 


 


Creatinine 


 


Est GFR ( Amer) 


 


Est GFR (Non-Af Amer) 


 


POC Glucose (mg/dL)  101


 


Random Glucose 


 


Calcium 


 


Total Bilirubin 


 


AST 


 


ALT 


 


Alkaline Phosphatase 


 


Total Protein 


 


Albumin 


 


Globulin 


 


Albumin/Globulin Ratio 


 


Lipase 


 


Urine Color 


 


Urine Clarity 


 


Urine pH 


 


Ur Specific Gravity 


 


Urine Protein 


 


Urine Glucose (UA) 


 


Urine Ketones 


 


Urine Blood 


 


Urine Nitrate 


 


Urine Bilirubin 


 


Urine Urobilinogen 


 


Ur Leukocyte Esterase 


 


Urine WBC (Auto) 


 


Urine RBC (Auto) 


 


Ur Squamous Epith Cells 














Assessment & Plan


(1) DVT (deep venous thrombosis)


Status: Acute   





(2) DVT (deep venous thrombosis)


Status: Acute   





(3) Lindy-rectal abscess


Status: Acute   





(4) Perianal abscess


Status: Acute   





- Assessment and Plan (Free Text)


Assessment: 





iv rx ordered


cultures pending

## 2018-05-15 NOTE — RAD
HISTORY:

FEVER  



COMPARISON:

2/24/2014



TECHNIQUE:

Chest PA and lateral



FINDINGS:



LUNGS:

No active pulmonary disease.



PLEURA:

No significant pleural effusion identified. No pneumothorax apparent.



CARDIOVASCULAR:

Normal.



OSSEOUS STRUCTURES:

No significant abnormalities.



VISUALIZED UPPER ABDOMEN:

Normal.



OTHER FINDINGS:

None.



IMPRESSION:

No active disease.

## 2018-05-16 PROCEDURE — 0J970ZZ DRAINAGE OF BACK SUBCUTANEOUS TISSUE AND FASCIA, OPEN APPROACH: ICD-10-PCS

## 2018-05-16 PROCEDURE — 0JB70ZZ EXCISION OF BACK SUBCUTANEOUS TISSUE AND FASCIA, OPEN APPROACH: ICD-10-PCS

## 2018-05-16 RX ADMIN — CEFAZOLIN SODIUM SCH MLS/HR: 1 SOLUTION INTRAVENOUS at 18:40

## 2018-05-16 RX ADMIN — HYDROMORPHONE HYDROCHLORIDE PRN MG: 1 INJECTION, SOLUTION INTRAMUSCULAR; INTRAVENOUS; SUBCUTANEOUS at 15:00

## 2018-05-16 RX ADMIN — HYDROMORPHONE HYDROCHLORIDE PRN MG: 1 INJECTION, SOLUTION INTRAMUSCULAR; INTRAVENOUS; SUBCUTANEOUS at 14:11

## 2018-05-16 NOTE — OP
PROCEDURE DATE:  05/15/2018



PREOPERATIVE DIAGNOSIS:  Infected sacral mass.



POSTOPERATIVE DIAGNOSIS:  Infected sacral mass.



PROCEDURE PERFORMED:  Wide deep excision, infected sacral mass.



SURGEON:  Raoul Miranda MD



ANESTHESIA:  General.



BLOOD LOSS:  30 mL.



POSTOPERATIVE CONDITION:  Stable.



INDICATIONS FOR SURGERY:  This is a 43-year-old male who presented with an

infected mass of the sacral area, now undergo wide deep excision.



DESCRIPTION OF PROCEDURE:  The patient was taken to the operating room,

placed in a prone position, IV sedation was administered.  A generous

elliptical incision measuring 6 x 9 cm was made surrounding the mass.  The

mass was complete dissected free to the presacral fascia and removed. 

Bleeding was controlled using the Bovie.  Parasacral blood vessel was

repaired.  Wound was irrigated with copious amounts of saline solution and

a partial advancement flap closure was performed at the periphery.  Central

portion of the wound was packed with wet saline gauze.  The patient

tolerated the  procedure well.  Returned to recovery room in stable

condition.

 



__________________________________________

Raoul Miranda MD





DD:  05/15/2018 14:26:29

DT:  05/15/2018 14:29:14

Job # [id number]

## 2018-05-16 NOTE — VASCLAB
PROCEDURE:  Lower Extremity Venous Duplex Exam.



HISTORY:

Pain in limb, Tenderness 



PRIORS:

None. 



TECHNIQUE:

Bilateral common femoral, femoral, popliteal and posterior tibial, 

peroneal and great saphenous veins were evaluated. Flow was assessed 

with color Doppler, compressibility, assessment of phasic flow and 

augmentation response.



Report prepared by   JP Singh



FINDINGS:



RIGHT:

1. Common Femoral Vein: 



1.1. Compressibility - Fully compressible: Thrombus -  None : Flow - 

Phasic: Augmentation -Normal: Reflux - None.



2. Femoral Vein:



2.1. Compressibility - Fully compressible: Thrombus -  None : Flow - 

Phasic: Augmentation -Normal: Reflux - None.



3. Popliteal Vein: 



3.1. Compressibility - Fully compressible: Thrombus - None :  Flow - 

Phasic: Augmentation -Normal: Reflux - None.



4. Posterior Tibial Vein: 



4.1. Compressibility - Fully compressible: Thrombus -  None: Flow - 

Phasic: Augmentation -Normal: Reflux - None.



5. Peroneal Vein:



5.1. Compressibility - Fully compressible: Thrombus -  None: Flow - 

Phasic: Augmentation -Normal: Reflux - None.



6. Great Saphenous Vein:

6.1. Compressibility - Fully compressible: Thrombus - None: Flow - 

Phasic: Augmentation - Normal: Reflux - None.





LEFT:

1. Common Femoral Vein:



1.1.  Compressibility - Fully compressible: Thrombus -  None: Flow - 

Phasic: Augmentation -Normal: Reflux - None.



2. Femoral Vein:



2.1.  Compressibility - Fully compressible: Thrombus -  None: Flow - 

Phasic: Augmentation -Normal: Reflux - None.



3. Popliteal Vein:



3.1.  Compressibility - Fully compressible: Thrombus -  None : Flow - 

Phasic: Augmentation -Normal: Reflux - None.



4. Posterior Tibial Vein:



4.1.  Compressibility - Fully compressible: Thrombus -  None: Flow - 

Phasic: Augmentation -Normal: Reflux - None.



5. Peroneal Vein:



5.1.  Compressibility - Partial: Thrombus -  Acute: Flow - Reduced : 

Augmentation -Normal: Reflux - None.



6. Great Saphenous Vein:

6.1.  Compressibility - Fully compressible: Thrombus -  None: Flow - 

Phasic: Augmentation - Normal: Reflux - Mild. 2.06s





OTHER FINDINGS:  



IMPRESSION:

Right: 



No evidence of deep or superficial vein thrombosis of the right lower 

extremity. Normal valve function noted of the right side.     



Left: 



Partial acute deep vein thrombosis of the left peroneal veins, with 

mild reduction of the venous return. Mild valvular incompetence noted 

of the left great saphenous vein.



Findings were reported by the vascular technologist, to ISELA Olvera 

at 8:51 a.m.

## 2018-05-16 NOTE — CP.PCM.PN
Subjective





- Date & Time of Evaluation


Date of Evaluation: 05/16/18


Time of Evaluation: 07:00





- Subjective


Subjective: 





+ acute dvt left leg


Dr welch notified 


orders written


s/p I and D  


c/o pain


wound packing in place


denies sob / cough


started on eliquis 


cultures pending





Objective





- Vital Signs/Intake and Output


Vital Signs (last 24 hours): 


 











Temp Pulse Resp BP Pulse Ox


 


 97.7 F   82   20   125/76   97 


 


 05/16/18 16:47  05/16/18 16:47  05/16/18 16:47  05/16/18 16:47  05/16/18 16:47








Intake and Output: 


 











 05/16/18 05/17/18





 18:59 06:59


 


Intake Total 1000 


 


Balance 1000 














- Medications


Medications: 


 Current Medications





Apixaban (Eliquis)  5 mg PO BID [facility]


   Last Admin: 05/16/18 17:06 Dose:  5 mg


Sodium Chloride (Sodium Chloride 0.9%)  1,000 mls @ 100 mls/hr IV .Q10H [facility]


   Last Admin: 05/16/18 11:20 Dose:  Not Given


Lactated Ringer's (Lactated Ringer's)  1,000 mls @ 150 mls/hr IV .Q6H40M [facility]


Cefazolin Sodium/Dextrose (Ancef Iv 1 Gm Duplex)  1 gm in 50 mls @ 100 mls/hr 

IVPB Q8H [facility]ANCIS


   PRN Reason: Protocol


   Last Admin: 05/16/18 18:40 Dose:  100 mls/hr


Tramadol HCl (Ultram)  100 mg PO Q8H PRN


   PRN Reason: Pain, moderate (4-7)


   Last Admin: 05/16/18 18:40 Dose:  100 mg











- Labs


Labs: 


 





 05/14/18 12:01 





 05/14/18 12:01 





 











PT  11.7 SECONDS (9.7-12.2)   05/14/18  12:01    


 


INR  1.1   05/14/18  12:01    


 


APTT  30 SECONDS (21-34)   05/14/18  12:01    














- Constitutional


Appears: Non-toxic, Chronically Ill





- Head Exam


Head Exam: NORMOCEPHALIC





- Eye Exam


Eye Exam: PERRL





- ENT Exam


ENT Exam: Mucous Membranes Dry





- Neck Exam


Neck Exam: absent: Lymphadenopathy





- Respiratory Exam


Respiratory Exam: Decreased Breath Sounds





- Cardiovascular Exam


Cardiovascular Exam: REGULAR RHYTHM





- GI/Abdominal Exam


GI & Abdominal Exam: Distended





- Rectal Exam


Rectal Exam: Deferred





-  Exam


 Exam: NORMAL INSPECTION





- Extremities Exam


Extremities Exam: absent: Pedal Edema





- Back Exam


Back Exam: absent: CVA tenderness (L), CVA tenderness (R)





- Neurological Exam


Neurological Exam: Alert, Awake, Oriented x3





- Psychiatric Exam


Psychiatric exam: Normal Mood





Assessment and Plan





- Assessment and Plan (Free Text)


Plan: 





cont iv rx

## 2018-05-17 VITALS — RESPIRATION RATE: 20 BRPM

## 2018-05-17 PROCEDURE — 0J970ZZ DRAINAGE OF BACK SUBCUTANEOUS TISSUE AND FASCIA, OPEN APPROACH: ICD-10-PCS

## 2018-05-17 PROCEDURE — 0JB70ZZ EXCISION OF BACK SUBCUTANEOUS TISSUE AND FASCIA, OPEN APPROACH: ICD-10-PCS

## 2018-05-17 PROCEDURE — 0HX6XZZ TRANSFER BACK SKIN, EXTERNAL APPROACH: ICD-10-PCS

## 2018-05-17 RX ADMIN — CEFAZOLIN SODIUM SCH MLS/HR: 1 SOLUTION INTRAVENOUS at 02:23

## 2018-05-17 RX ADMIN — CEFAZOLIN SODIUM SCH MLS: 1 SOLUTION INTRAVENOUS at 10:47

## 2018-05-17 RX ADMIN — HYDROMORPHONE HYDROCHLORIDE PRN MG: 1 INJECTION, SOLUTION INTRAMUSCULAR; INTRAVENOUS; SUBCUTANEOUS at 11:51

## 2018-05-17 RX ADMIN — HYDROMORPHONE HYDROCHLORIDE PRN MG: 1 INJECTION, SOLUTION INTRAMUSCULAR; INTRAVENOUS; SUBCUTANEOUS at 11:22

## 2018-05-17 RX ADMIN — CEFAZOLIN SODIUM SCH MLS/HR: 1 SOLUTION INTRAVENOUS at 17:15

## 2018-05-17 NOTE — CP.PCM.PN
Subjective





- Date & Time of Evaluation


Date of Evaluation: 05/17/18


Time of Evaluation: 10:00





- Subjective


Subjective: 





improving


iv rx in progress


all cultures neg


d/c on PO and blood thinner  may need heme eval as out pt 





Objective





- Vital Signs/Intake and Output


Vital Signs (last 24 hours): 


 











Temp Pulse Resp BP Pulse Ox


 


 98.6 F   89   20   114/70   95 


 


 05/17/18 16:09  05/17/18 16:09  05/17/18 16:09  05/17/18 16:09  05/17/18 16:09








Intake and Output: 


 











 05/17/18 05/18/18





 18:59 06:59


 


Intake Total 1315 


 


Output Total 300 


 


Balance 1015 














- Medications


Medications: 


 Current Medications





Apixaban (Eliquis)  5 mg PO BID [facility]


   Last Admin: 05/17/18 17:13 Dose:  5 mg


Sodium Chloride (Sodium Chloride 0.9%)  1,000 mls @ 100 mls/hr IV .Q10H [facility]


   Last Admin: 05/17/18 17:18 Dose:  Not Given


Cefazolin Sodium/Dextrose (Ancef Iv 1 Gm Duplex)  1 gm in 50 mls @ 100 mls/hr 

IVPB Q8H [facility]


   PRN Reason: Protocol


   Last Admin: 05/17/18 17:15 Dose:  100 mls/hr


Morphine Sulfate (Morphine)  4 mg IV Q4 PRN


   PRN Reason: Pain, severe (8-10)


   Last Admin: 05/17/18 18:42 Dose:  4 mg


Tramadol HCl (Ultram)  100 mg PO Q8H PRN


   PRN Reason: Pain, moderate (4-7)


   Last Admin: 05/17/18 09:11 Dose:  100 mg











- Labs


Labs: 


 





 05/14/18 12:01 





 05/14/18 12:01 





 











PT  11.7 SECONDS (9.7-12.2)   05/14/18  12:01    


 


INR  1.1   05/14/18  12:01    


 


APTT  30 SECONDS (21-34)   05/14/18  12:01    














Assessment and Plan


(1) DVT (deep venous thrombosis)


Status: Acute   





(2) DVT (deep venous thrombosis)


Status: Acute   





(3) Lindy-rectal abscess


Status: Acute   





(4) Perianal abscess


Status: Acute

## 2018-05-17 NOTE — OP
PROCEDURE DATE:  05/17/2018



PREOPERATIVE DIAGNOSIS:  Sacral abscess with a large open wound.



POSTOPERATIVE DIAGNOSIS:  Sacral abscess with a large open wound.



PROCEDURE PERFORMED:  Redrainage of sacral abscess with debridement and

advancement flap closure, large open wound to the sacrum.



SURGEON:  Raoul Miranda MD.



ANESTHESIA  General.



ESTIMATED BLOOD LOSS:  40 mL.



POSTOPERATIVE CONDITION:  Stable.



INDICATIONS FOR SURGERY:  This is a 43-year-old male with a abscess of the

sacral area, status post multiple debridements taken back to the OR in a

stage procedure for debridement and closure of the wound and now that is

granulated.



PROCEDURE  The patient was taken to the operating room, placed in the prone

position, IV sedation was administered and the area was anesthetized with

local anesthesia.  The wound was then pulse irrigated and again

aggressively debrided and any remaining collections were drained.  Generous

full-thickness flaps were raised both laterally and a presacral blood

vessel was repaired.  The wound was irrigated, again irrigated and closed

with multiple layers of Monocryl and subcuticular Monocryl.  Bacitracin was

placed on the wound and it was dressed sterilely.  The patient tolerated

the procedure well and returned to recovery room in stable condition.







__________________________________________

Raoul Miranda MD





DD:  05/17/2018 11:31:09

DT:  05/17/2018 11:34:02

Job # [id number]

## 2018-05-17 NOTE — OP
PROCEDURE DATE:  05/16/2018



PREOPERATIVE DIAGNOSIS  Infected sacral mass with sacral abscess.



POSTOPERATIVE DIAGNOSIS:  Infected sacral mass with sacral abscess, sacral

polyp.



PROCEDURE PERFORMED:  A wide and deep excision of a sacral polyp with

debridement of open sacral wound and partial tissue transfer closure and

repair of parasacral blood vessel.



SURGEON:  Raoul Miranda MD.



ANESTHESIA:  General endotracheal.



ESTIMATED BLOOD LOSS:  30 mL.



POSTOPERATIVE CONDITION:  Stable.



INDICATIONS FOR SURGERY:  This is a 43-year-old male who presented with a

sacral abscess and had underwent a wide deep excision of a sacral mass, the

patient was taken back to the operating room for change of packing vigorous

irrigation and cleansing of the wound and possible closure.



PROCEDURE:  The patient was taken to the operating room and placed in the

prone position, IV sedation was administered.  The wound was anesthetized

with local anesthesia 0.25 Marcaine 1% lidocaine.  The wound was now

aggressively debrided and any remaining collections were drained and

cultured.  Bleeding was controlled using a Bovie.  Parasacral blood vessel

was repaired and there was noted a sacral polyp in the midportion of sacrum

which was excised.  Bleeding was again controlled using the Bovie.  Partial

advancement flap closure was performed at the periphery by raising

full-thickness flaps, central portion was packed open with saline gauze. 

The patient tolerated the procedure well, returned to recovery room in

stable condition.







__________________________________________

Raoul Miranda MD





DD:  05/16/2018 14:19:15

DT:  05/16/2018 14:21:57

Job # [id number]

## 2018-05-18 VITALS — OXYGEN SATURATION: 100 %

## 2018-05-18 VITALS — TEMPERATURE: 98.4 F | HEART RATE: 76 BPM | SYSTOLIC BLOOD PRESSURE: 106 MMHG | DIASTOLIC BLOOD PRESSURE: 68 MMHG

## 2018-05-18 RX ADMIN — CEFAZOLIN SODIUM SCH MLS/HR: 1 SOLUTION INTRAVENOUS at 01:25

## 2018-05-18 RX ADMIN — CEFAZOLIN SODIUM SCH MLS/HR: 1 SOLUTION INTRAVENOUS at 09:05

## 2019-01-07 ENCOUNTER — HOSPITAL ENCOUNTER (EMERGENCY)
Dept: HOSPITAL 31 - C.ER | Age: 44
Discharge: LEFT BEFORE DISCHARGE | End: 2019-01-07
Payer: COMMERCIAL

## 2019-01-07 VITALS — BODY MASS INDEX: 39.4 KG/M2

## 2019-01-07 VITALS — TEMPERATURE: 98 F | DIASTOLIC BLOOD PRESSURE: 76 MMHG | SYSTOLIC BLOOD PRESSURE: 112 MMHG | HEART RATE: 86 BPM

## 2019-01-07 VITALS — RESPIRATION RATE: 18 BRPM

## 2019-01-07 DIAGNOSIS — R10.33: Primary | ICD-10-CM

## 2019-01-07 DIAGNOSIS — R11.10: ICD-10-CM

## 2019-01-07 DIAGNOSIS — R74.8: ICD-10-CM

## 2019-01-07 LAB
ALBUMIN SERPL-MCNC: 4.8 G/DL (ref 3.5–5)
ALBUMIN/GLOB SERPL: 1.3 {RATIO} (ref 1–2.1)
ALT SERPL-CCNC: 54 U/L (ref 21–72)
APTT BLD: 29 SECONDS (ref 21–34)
AST SERPL-CCNC: 34 U/L (ref 17–59)
BASOPHILS # BLD AUTO: 0 K/UL (ref 0–0.2)
BASOPHILS NFR BLD: 0.6 % (ref 0–2)
BILIRUB UR-MCNC: NEGATIVE MG/DL
BUN SERPL-MCNC: 12 MG/DL (ref 9–20)
CALCIUM SERPL-MCNC: 9 MG/DL (ref 8.6–10.4)
EOSINOPHIL # BLD AUTO: 0.1 K/UL (ref 0–0.7)
EOSINOPHIL NFR BLD: 1.4 % (ref 0–4)
ERYTHROCYTE [DISTWIDTH] IN BLOOD BY AUTOMATED COUNT: 13.6 % (ref 11.5–14.5)
GFR NON-AFRICAN AMERICAN: > 60
GLUCOSE UR STRIP-MCNC: NORMAL MG/DL
HGB BLD-MCNC: 17.3 G/DL (ref 12–18)
HYALINE CASTS #/AREA URNS LPF: (no result) /LPF (ref 0–2)
INR PPP: 1.1
LEUKOCYTE ESTERASE UR-ACNC: (no result) LEU/UL
LIPASE: 788 U/L (ref 23–300)
LYMPHOCYTES # BLD AUTO: 3.2 K/UL (ref 1–4.3)
LYMPHOCYTES NFR BLD AUTO: 43.4 % (ref 20–40)
MCH RBC QN AUTO: 31.7 PG (ref 27–31)
MCHC RBC AUTO-ENTMCNC: 34.2 G/DL (ref 33–37)
MCV RBC AUTO: 92.6 FL (ref 80–94)
MONOCYTES # BLD: 0.3 K/UL (ref 0–0.8)
MONOCYTES NFR BLD: 4.5 % (ref 0–10)
NEUTROPHILS # BLD: 3.7 K/UL (ref 1.8–7)
NEUTROPHILS NFR BLD AUTO: 50.1 % (ref 50–75)
NRBC BLD AUTO-RTO: 0.1 % (ref 0–2)
PH UR STRIP: 5 [PH] (ref 5–8)
PLATELET # BLD: 268 K/UL (ref 130–400)
PMV BLD AUTO: 7.9 FL (ref 7.2–11.7)
PROT UR STRIP-MCNC: (no result) MG/DL
PROTHROMBIN TIME: 12.1 SECONDS (ref 9.7–12.2)
RBC # BLD AUTO: 5.47 MIL/UL (ref 4.4–5.9)
RBC # UR STRIP: NEGATIVE /UL
SP GR UR STRIP: 1.03 (ref 1–1.03)
SQUAMOUS EPITHIAL: < 1 /HPF (ref 0–5)
UROBILINOGEN UR-MCNC: NORMAL MG/DL (ref 0.2–1)
WBC # BLD AUTO: 7.3 K/UL (ref 4.8–10.8)

## 2019-01-07 PROCEDURE — 96375 TX/PRO/DX INJ NEW DRUG ADDON: CPT

## 2019-01-07 PROCEDURE — 71046 X-RAY EXAM CHEST 2 VIEWS: CPT

## 2019-01-07 PROCEDURE — 80053 COMPREHEN METABOLIC PANEL: CPT

## 2019-01-07 PROCEDURE — 96361 HYDRATE IV INFUSION ADD-ON: CPT

## 2019-01-07 PROCEDURE — 74177 CT ABD & PELVIS W/CONTRAST: CPT

## 2019-01-07 PROCEDURE — 85025 COMPLETE CBC W/AUTO DIFF WBC: CPT

## 2019-01-07 PROCEDURE — 96374 THER/PROPH/DIAG INJ IV PUSH: CPT

## 2019-01-07 PROCEDURE — 85610 PROTHROMBIN TIME: CPT

## 2019-01-07 PROCEDURE — 99285 EMERGENCY DEPT VISIT HI MDM: CPT

## 2019-01-07 PROCEDURE — 85730 THROMBOPLASTIN TIME PARTIAL: CPT

## 2019-01-07 PROCEDURE — 81001 URINALYSIS AUTO W/SCOPE: CPT

## 2019-01-07 PROCEDURE — 83690 ASSAY OF LIPASE: CPT

## 2019-01-07 NOTE — C.PDOC
History Of Present Illness


43 year old male with a history of diabetes and high cholesterol presents to the

emergency department with complaints of abdominal pain associated with vomiting 

for the last week. Pain is dull and radiates through to back. As per patient, he

is not compliant with his Metformin because it upsets his stomach. Patient 

states that on New Year's Julia, he ate "some spread on bread" after which he 

began vomiting. Patient states that he has been vomiting 1-2 times per day, but 

denies hematemesis, diarrhea. Patient denies sick contact, and states that he 

has not received a flu vaccination this season. He denies taking medications for

his symptoms. Denies fevers, chills, diarrhea, constipation, urinary symptoms, 

testicular pain/swelling, CP, palpitations, SOB, or any other associated 

complaints. 


Time Seen by Provider: 01/07/19 10:00


Chief Complaint (Nursing): Abdominal Pain


History Per: Patient


History/Exam Limitations: no limitations


Onset/Duration Of Symptoms: Days (7)


Current Symptoms Are (Timing): Still Present


Context: Food


Location Of Pain/Discomfort: Other (abdominen)


Quality Of Discomfort: "Pain"


Associated Symptoms: Chills, Vomiting, Back Pain.  denies: Fever, Nausea, 

Diarrhea





Past Medical History


Reviewed: Historical Data, Nursing Documentation, Vital Signs


Vital Signs: 





                                Last Vital Signs











Temp  98.2 F   01/07/19 09:52


 


Pulse  86   01/07/19 09:52


 


Resp  18   01/07/19 09:52


 


BP  129/83   01/07/19 09:52


 


Pulse Ox  98   01/07/19 09:52














- Medical History


PMH: Asthma, Diabetes, Hypercholesterolemia


   Denies: Chronic Kidney Disease


Surgical History: Appendectomy, Back Surgery





- [facility] Procedures











DRAINAGE OF ANUS, OPEN APPROACH, DIAGNOSTIC (04/30/18)


DRAINAGE OF BACK SUBCU/FASCIA, OPEN APPROACH (05/16/18)


DRAINAGE OF PELVIC CAVITY, OPEN APPROACH, DIAGNOSTIC (04/30/18)


DRAINAGE OF RECTUM, ENDO (04/17/18)


EXCISION OF BACK SUBCU/FASCIA, OPEN APPROACH (05/16/18)


REPAIR LOWER VEIN, OPEN APPROACH (04/30/18)


REPAIR RECTUM, PERCUTANEOUS APPROACH (04/17/18)


TRANSFER BACK SKIN, EXTERNAL APPROACH (05/16/18)


TRANSFER PERINEUM SKIN, EXTERNAL APPROACH (04/30/18)








Family History: States: No Known Family Hx





- Social History


Hx Tobacco Use: No


Hx Alcohol Use: Yes (Socially)


Hx Substance Use: No





- Immunization History


Hx Tetanus Toxoid Vaccination: No


Hx Influenza Vaccination: No


Hx Pneumococcal Vaccination: No





Review Of Systems


Except As Marked, All Systems Reviewed And Found Negative.


Constitutional: Negative for: Fever, Chills


Eyes: Negative for: Vision Change


ENT: Negative for: Nose Congestion


Cardiovascular: Negative for: Chest Pain, Palpitations, Light Headedness


Respiratory: Negative for: Cough, Shortness of Breath


Gastrointestinal: Positive for: Vomiting, Abdominal Pain.  Negative for: Nausea,

Diarrhea, Hematemesis


Genitourinary: Negative for: Dysuria, Frequency, Penile Discharge, Scrotal Pain,

Rash, Penile Pain


Musculoskeletal: Positive for: Back Pain


Neurological: Negative for: Weakness, Numbness, Headache, Dizziness





Physical Exam





- Physical Exam


Appears: Non-toxic, No Acute Distress


Skin: Normal Color, Warm, Dry


Head: Atraumatic, Normacephalic


Eye(s): bilateral: Normal Inspection, PERRL, EOMI


Nose: Normal


Oral Mucosa: Moist


Throat: Normal


Neck: Normal, Normal ROM, Supple


Chest: Symmetrical, No Tenderness


Cardiovascular: Rhythm Regular, No Murmur


Respiratory: Normal Breath Sounds, No Rales, No Rhonchi, No Wheezing


Gastrointestinal/Abdominal: No Bowel Sounds (hypoactive bowel sounds), Soft, 

Tenderness (periumbilical tenderness), Distention (mild), No Guarding, No 

Rebound


Back: Normal Inspection, No CVA Tenderness, No Vertebral Tenderness, No 

Paraspinal Tenderness


Extremity: Normal ROM, Capillary Refill (<2s)


Extremity: Bilateral: Atraumatic, No Pedal Edema, Normal Color And Temperature, 

Normal ROM


Pulses: Left Radial: Normal, Right Radial: Normal


Neurological/Psych: Oriented x3, Normal Speech, Normal Cognition, Normal Motor, 

Normal Sensation


Gait: Steady





ED Course And Treatment





- Laboratory Results


Result Diagrams: 


                                 01/07/19 11:00





                                 01/07/19 11:00


O2 Sat by Pulse Oximetry: 98 (RA)


Pulse Ox Interpretation: Normal





- Other Rad


  ** CXR


X-Ray: Viewed By Me, Read By Radiologist


Interpretation: IMPRESSION:  No focal consolidation.





Medical Decision Making


Medical Decision Making: 


Initial Plan:


* CBC, CMP


* Lipase


* Coags


* UA


* CXR


* CT Abd/Pelvis


* IVF


* Toradol


* Zofran





1300


Patient continues to c/o nausea after Zofran


Bloodwork unremarkable other than elevated lipase at 788





1430


Patient eating full meal in ED stretcher brought to him by family. Advised to 

stop until CT results are back.


CXR no active disease





1630


CT negative for acute pathology, shows underdistended gallbladder and 

hepatomegaly.





1700


Patient was recommended to be admitted for elevated lipase, as well as 

persistent abdominal pain, nausea, and vomiting. Patient is choosing to leave 

[facility]. 





On interview pt with capacity. Discussed with patient the risks, benefits and 

alternatives of leaving without completing the evaluation in the emergency 

department, including death and permanent neurologic dysfunction. Patient 

understands the decision being made, alternative options, the risks and benefits

of those options. Pt demonstrated understanding of this information, the ability

to apply it to themself and ability to communicate the decision and its 

consequences. Pt still wishes to leave [facility]. Discussed at length with pt the 

reasons would like for pt to stay for further tx and concerns; however, pt 

refuses to stay for further tx. Pt understands risk of death and/or disability 

by leaving and still wishes to sign out against medical advice. Pts preference 

is to leave the hospital and elects to follow up in the outpatient setting 

rather than stay and have medical workup. Will discharge against medical advice 

and pt encouraged to return to ED immediately should they change their mind 

regarding care or if any new concerning symptoms arise.








Disposition





- Disposition


Referrals: 


[facility] [Outside]


Osman Miguel MD [Staff Provider] - 


Disposition: AGAINST MEDICAL ADVICE


Disposition Time: 17:00


Condition: STABLE


Additional Instructions: 


Return to ER if you wish to be re-evaluated or if new/worsening symptoms arise


Ibuprofen/Tylenol for pain


Followup with primary doctor today


Followup with GI within 2 days


Instructions:  Acute Abdomen (Belly Pain), Adult (DC), Nausea and Vomiting, 

Adult, Lipase Blood Test, Leaving Against Medical Advice


Forms:  [facility] (English)





- Clinical Impression


Clinical Impression: 


 Elevated lipase, Left against medical advice, Abdominal pain, Vomiting








- PA / NP / Resident Statement


MD/DO has reviewed & agrees with the documentation as recorded.





- Scribe Statement


The provider has reviewed the documentation as recorded by the Scribe (Mazin Roberts)


All medical record entries made by the Scribe were at my direction and 

personally dictated by me. I have reviewed the chart and agree that the record 

accurately reflects my personal performance of the history, physical exam, 

medical decision making, and the department course for this patient. I have also

personally directed, reviewed, and agree with the discharge instructions and 

disposition.

## 2019-01-07 NOTE — CT
Date of service: 



01/07/2019



PROCEDURE:  CT Abdomen and Pelvis with contrast



HISTORY:

Generalized abdominal pain, vomiting



COMPARISON:

Comparison made with prior CT scan the abdomen and pelvis 05/14/2018.



TECHNIQUE:

Contiguous helical/transaxial sections of the abdomen pelvis 

performed following intravenous injection of approximately 100 cc 

Visipaque 320 contrast material.  Additional 2D   sagittal and 

coronal reformats generated 



Radiation dose:



Total exam DLP = 1141.47 mGy-cm.



This CT exam was performed using one or more of the following dose 

reduction techniques: Automated exposure control, adjustment of the 

mA and/or kV according to patient size, and/or use of iterative 

reconstruction technique.



FINDINGS:



LOWER THORAX:

Heart is within range of normal. No significant pericardial effusion. 

Small hiatal hernia. 



Mild passive atelectasis both posterior lower lung fields some 

nodular pleural thickening right middle lobe region.. 



LIVER:

The liver is enlarged measuring over 20 cm in CC dimension. Mild 

fatty hepatic infiltration.. 



GALLBLADDER AND BILE DUCTS:

Gallbladder is incompletely distended which presumably accounts for 

thick-walled appearance. Findings likely due to nonfasting state. 

Clinical correlation recommended. 



PANCREAS:

Pancreas appears atrophic and fatty replaced. No pancreatic mass 

collection or calcification.



SPLEEN:

Unremarkable. 



ADRENALS:

No adrenal lesions. 



KIDNEYS AND URETERS:

Kidneys demonstrate relatively symmetric nephrograms with no evidence 

of nephrolithiasis or hydronephrosis 



VASCULATURE:

Unremarkable. No aortic aneurysm. No aortic atherosclerotic 

calcification or mural plaque present.



BOWEL:

. Evaluation of the bowel is somewhat limited due to the lack of oral 

contrast material. Stomach is distended with food debris liquid and 

air. Visualized loops of small bowel exhibit normal contour and 

caliber. No evidence of acute mechanical bowel obstruction.



Stool and air seen throughout the large bowel. There does appear to 

be a few scattered colonic diverticula seen along the sigmoid colon 

however no radiographic evidence of acute diverticulitis.



APPENDIX:

Appendectomy. 



PERITONEUM:

Unremarkable. No free fluid. No free air. Bilateral fat containing 

umbilical hernias



LYMPH NODES:

Unremarkable. No enlarged lymph nodes. 



BLADDER:

The urinary bladder is physiologically distended. No evidence of 

intraluminal gallbladder calculi 



REPRODUCTIVE:

Unremarkable. 



BONES:

No mild multilevel degenerative spondylosis of the lower thoracic and 

lumbar spine. 



OTHER FINDINGS:

None.



IMPRESSION:

Hepatomegaly with mild fatty hepatic infiltration. 



No acute intra abdominal pathology. 



Gallbladder incompletely distended likely due to nonfasting state. 



. Appendectomy.

## 2019-01-07 NOTE — RAD
HISTORY:

 cough 



COMPARISON:

Chest x-ray 5/15/18 



TECHNIQUE:

Chest PA and lateral



FINDINGS:





LUNGS:

No focal consolidation.



Please note that chest x-ray has limited sensitivity for the 

detection of pulmonary masses.



PLEURA:

No significant pleural effusion identified. No definite pneumothorax .



CARDIOVASCULAR:

Heart size appears within limits.  No atherosclerotic calcification 

present.



OSSEOUS STRUCTURES:

No acute osseous abnormality identified.



VISUALIZED UPPER ABDOMEN:

Unremarkable.



OTHER FINDINGS:

None.



IMPRESSION:

No focal consolidation.

## 2019-01-09 VITALS — OXYGEN SATURATION: 98 %
